# Patient Record
Sex: MALE | Race: WHITE | NOT HISPANIC OR LATINO | Employment: OTHER | URBAN - METROPOLITAN AREA
[De-identification: names, ages, dates, MRNs, and addresses within clinical notes are randomized per-mention and may not be internally consistent; named-entity substitution may affect disease eponyms.]

---

## 2017-06-13 ENCOUNTER — OFFICE VISIT (OUTPATIENT)
Dept: FAMILY MEDICINE CLINIC | Facility: CLINIC | Age: 67
End: 2017-06-13

## 2017-06-13 VITALS
RESPIRATION RATE: 16 BRPM | HEART RATE: 62 BPM | HEIGHT: 67 IN | SYSTOLIC BLOOD PRESSURE: 142 MMHG | BODY MASS INDEX: 37.39 KG/M2 | WEIGHT: 238.2 LBS | DIASTOLIC BLOOD PRESSURE: 80 MMHG | TEMPERATURE: 99.1 F | OXYGEN SATURATION: 98 %

## 2017-06-13 DIAGNOSIS — E78.2 MIXED HYPERLIPIDEMIA: ICD-10-CM

## 2017-06-13 DIAGNOSIS — E78.2 MIXED HYPERLIPIDEMIA: Primary | ICD-10-CM

## 2017-06-13 DIAGNOSIS — R39.14 FEELING OF INCOMPLETE BLADDER EMPTYING: ICD-10-CM

## 2017-06-13 DIAGNOSIS — R07.9 CHEST PAIN, UNSPECIFIED TYPE: ICD-10-CM

## 2017-06-13 DIAGNOSIS — E08.22 DIABETES MELLITUS DUE TO UNDERLYING CONDITION WITH DIABETIC CHRONIC KIDNEY DISEASE, UNSPECIFIED CKD STAGE, UNSPECIFIED LONG TERM INSULIN USE STATUS: ICD-10-CM

## 2017-06-13 DIAGNOSIS — I10 ESSENTIAL HYPERTENSION: ICD-10-CM

## 2017-06-13 DIAGNOSIS — R94.31 ABNORMAL EKG: ICD-10-CM

## 2017-06-13 DIAGNOSIS — Z00.00 MEDICARE ANNUAL WELLNESS VISIT, INITIAL: Primary | ICD-10-CM

## 2017-06-13 LAB
ALBUMIN SERPL-MCNC: 4.4 G/DL (ref 3.5–5.2)
ALBUMIN/GLOB SERPL: 1.6 G/DL
ALP SERPL-CCNC: 58 U/L (ref 39–117)
ALT SERPL W P-5'-P-CCNC: 20 U/L (ref 1–41)
ANION GAP SERPL CALCULATED.3IONS-SCNC: 15 MMOL/L
AST SERPL-CCNC: 15 U/L (ref 1–40)
BILIRUB SERPL-MCNC: 0.3 MG/DL (ref 0.1–1.2)
BUN BLD-MCNC: 14 MG/DL (ref 8–23)
BUN/CREAT SERPL: 11.7 (ref 7–25)
CALCIUM SPEC-SCNC: 9 MG/DL (ref 8.6–10.5)
CHLORIDE SERPL-SCNC: 99 MMOL/L (ref 98–107)
CHOLEST SERPL-MCNC: 216 MG/DL (ref 0–200)
CO2 SERPL-SCNC: 21 MMOL/L (ref 22–29)
CREAT BLD-MCNC: 1.2 MG/DL (ref 0.76–1.27)
ERYTHROCYTE [DISTWIDTH] IN BLOOD BY AUTOMATED COUNT: 13.7 % (ref 4.5–15)
GFR SERPL CREATININE-BSD FRML MDRD: 60 ML/MIN/1.73
GLOBULIN UR ELPH-MCNC: 2.7 GM/DL
GLUCOSE BLD-MCNC: 180 MG/DL (ref 65–99)
HBA1C MFR BLD: 6.64 % (ref 4.8–5.6)
HCT VFR BLD AUTO: 38 % (ref 35–60)
HDLC SERPL-MCNC: 49 MG/DL (ref 40–60)
HGB BLD-MCNC: 12.6 G/DL (ref 13.5–18)
LDLC SERPL CALC-MCNC: 148 MG/DL (ref 0–100)
LDLC/HDLC SERPL: 3.02 {RATIO}
LYMPHOCYTES # BLD AUTO: 2.6 10*3/MM3 (ref 1.2–3.4)
LYMPHOCYTES NFR BLD AUTO: 30.3 % (ref 21–51)
MCH RBC QN AUTO: 28.8 PG (ref 26.1–33.1)
MCHC RBC AUTO-ENTMCNC: 33.3 G/DL (ref 33–37)
MCV RBC AUTO: 86.7 FL (ref 80–99)
MONOCYTES # BLD AUTO: 0.8 10*3/MM3 (ref 0.1–0.6)
MONOCYTES NFR BLD AUTO: 9.3 % (ref 2–9)
NEUTROPHILS # BLD AUTO: 5.2 10*3/MM3 (ref 1.4–6.5)
NEUTROPHILS NFR BLD AUTO: 60.4 % (ref 42–75)
PLATELET # BLD AUTO: 290 10*3/MM3 (ref 150–450)
PMV BLD AUTO: 8.7 FL (ref 7.1–10.5)
POTASSIUM BLD-SCNC: 4.7 MMOL/L (ref 3.5–5.2)
PROT SERPL-MCNC: 7.1 G/DL (ref 6–8.5)
PSA SERPL-MCNC: 0.57 NG/ML (ref 0–4)
RBC # BLD AUTO: 4.38 10*6/MM3 (ref 4–6)
SODIUM BLD-SCNC: 135 MMOL/L (ref 136–145)
TRIGL SERPL-MCNC: 96 MG/DL (ref 0–150)
VLDLC SERPL-MCNC: 19.2 MG/DL (ref 5–40)
WBC NRBC COR # BLD: 8.6 10*3/MM3 (ref 4.5–10)

## 2017-06-13 PROCEDURE — 84153 ASSAY OF PSA TOTAL: CPT | Performed by: INTERNAL MEDICINE

## 2017-06-13 PROCEDURE — 80053 COMPREHEN METABOLIC PANEL: CPT | Performed by: INTERNAL MEDICINE

## 2017-06-13 PROCEDURE — 93000 ELECTROCARDIOGRAM COMPLETE: CPT | Performed by: INTERNAL MEDICINE

## 2017-06-13 PROCEDURE — 99214 OFFICE O/P EST MOD 30 MIN: CPT | Performed by: INTERNAL MEDICINE

## 2017-06-13 PROCEDURE — 80061 LIPID PANEL: CPT | Performed by: INTERNAL MEDICINE

## 2017-06-13 PROCEDURE — 85025 COMPLETE CBC W/AUTO DIFF WBC: CPT | Performed by: INTERNAL MEDICINE

## 2017-06-13 PROCEDURE — 83036 HEMOGLOBIN GLYCOSYLATED A1C: CPT | Performed by: INTERNAL MEDICINE

## 2017-06-13 PROCEDURE — G0438 PPPS, INITIAL VISIT: HCPCS | Performed by: INTERNAL MEDICINE

## 2017-06-13 RX ORDER — SIMVASTATIN 40 MG
40 TABLET ORAL NIGHTLY
Qty: 30 TABLET | Refills: 3 | Status: SHIPPED | OUTPATIENT
Start: 2017-06-13 | End: 2017-10-17 | Stop reason: SDUPTHER

## 2017-06-13 RX ORDER — CITALOPRAM 40 MG/1
40 TABLET ORAL
COMMUNITY
End: 2017-08-04 | Stop reason: SDUPTHER

## 2017-06-13 RX ORDER — LABETALOL 200 MG/1
200 TABLET, FILM COATED ORAL 2 TIMES DAILY
COMMUNITY
End: 2017-11-15 | Stop reason: SDUPTHER

## 2017-06-13 RX ORDER — AMLODIPINE BESYLATE 10 MG/1
10 TABLET ORAL
COMMUNITY
End: 2017-08-04 | Stop reason: SDUPTHER

## 2017-06-13 RX ORDER — LANSOPRAZOLE 30 MG/1
30 CAPSULE, DELAYED RELEASE ORAL DAILY
Qty: 30 CAPSULE | Refills: 3 | Status: SHIPPED | OUTPATIENT
Start: 2017-06-13 | End: 2017-08-23 | Stop reason: ALTCHOICE

## 2017-06-13 RX ORDER — OMEPRAZOLE/SODIUM BICARBONATE 20MG-1.1G
CAPSULE ORAL
COMMUNITY
End: 2017-06-13 | Stop reason: ALTCHOICE

## 2017-06-13 RX ORDER — HYDROCODONE BITARTRATE AND ACETAMINOPHEN 5; 325 MG/1; MG/1
1 TABLET ORAL
COMMUNITY
End: 2017-08-23

## 2017-06-13 RX ORDER — LOVASTATIN 20 MG/1
20 TABLET ORAL
COMMUNITY
End: 2017-06-13 | Stop reason: ALTCHOICE

## 2017-06-13 RX ORDER — FLUTICASONE PROPIONATE 50 MCG
1 SPRAY, SUSPENSION (ML) NASAL
COMMUNITY
End: 2017-08-23 | Stop reason: ALTCHOICE

## 2017-06-13 RX ORDER — IBUPROFEN 800 MG/1
800 TABLET ORAL
COMMUNITY
End: 2017-08-04 | Stop reason: SDUPTHER

## 2017-06-13 RX ORDER — GLIMEPIRIDE 4 MG/1
4 TABLET ORAL DAILY
COMMUNITY
End: 2017-09-20 | Stop reason: SDUPTHER

## 2017-06-13 NOTE — PROGRESS NOTES
QUICK REFERENCE INFORMATION:  The ABCs of the Annual Wellness Visit    Initial Medicare Wellness Visit    HEALTH RISK ASSESSMENT    1950    Recent Hospitalizations:  No hospitalization(s) within the last year..        Current Medical Providers:  Patient Care Team:  Tre De Leon MD as PCP - General  Allegra Andersen MD as PCP - Claims Attributed        Smoking Status:  History   Smoking Status   • Former Smoker   Smokeless Tobacco   • Not on file       Alcohol Consumption:  History   Alcohol Use No     Comment: 2 CANS OF BEER PER MONTH       Depression Screen:   PHQ-9 Depression Screening 6/13/2017   Little interest or pleasure in doing things 0   Feeling down, depressed, or hopeless 0   Trouble falling or staying asleep, or sleeping too much 0   Feeling tired or having little energy 0   Poor appetite or overeating 0   Feeling bad about yourself - or that you are a failure or have let yourself or your family down 0   Trouble concentrating on things, such as reading the newspaper or watching television 0   Moving or speaking so slowly that other people could have noticed. Or the opposite - being so fidgety or restless that you have been moving around a lot more than usual 0   Thoughts that you would be better off dead, or of hurting yourself in some way 0   PHQ-9 Total Score 0   If you checked off any problems, how difficult have these problems made it for you to do your work, take care of things at home, or get along with other people? Not difficult at all       Health Habits and Functional and Cognitive Screening:  Functional & Cognitive Status 6/13/2017   Do you have difficulty preparing food and eating? No   Do you have difficulty bathing yourself? No   Do you have difficulty getting dressed? No   Do you have difficulty using the toilet? No   Do you have difficulty moving around from place to place? No   In the past year have you fallen or experienced a near fall? No   Do you need help using the  phone?  No   Are you deaf or do you have serious difficulty hearing?  No   Do you need help with transportation? No   Do you need help shopping? No   Do you need help preparing meals?  No   Do you need help with housework?  No   Do you need help with laundry? No   Do you need help taking your medications? No   Do you need help managing money? No   Do you have difficulty concentrating, remembering or making decisions? No       Health Habits  Current Diet: Diabetic Diet  Dental Exam: Not up to date  Eye Exam: Up to date  Exercise (times per week): 3 times per week  Current Exercise Activities Include: Walking          Does the patient have evidence of cognitive impairment? Yes    Asiprin use counseling: Taking ASA appropriately as indicated      Recent Lab Results:    Visual Acuity:  No exam data present    Age-appropriate Screening Schedule:  Refer to the list below for future screening recommendations based on patient's age, sex and/or medical conditions. Orders for these recommended tests are listed in the plan section. The patient has been provided with a written plan.    Health Maintenance   Topic Date Due   • INFLUENZA VACCINE  08/01/2017   • HEMOGLOBIN A1C  09/01/2017   • DIABETIC EYE EXAM  11/01/2017   • DIABETIC FOOT EXAM  12/01/2017   • LIPID PANEL  03/01/2018   • URINE MICROALBUMIN  04/01/2018   • PROSTATE CANCER SCREENING  06/13/2018   • COLONOSCOPY  01/01/2023   • TDAP/TD VACCINES (2 - Td) 06/13/2027   • PNEUMOCOCCAL VACCINES (65+ LOW/MEDIUM RISK)  Addressed   • ZOSTER VACCINE  Addressed        Subjective   History of Present Illness    James Solis is a 67 y.o. male who presents for an Annual Wellness Visit.    The following portions of the patient's history were reviewed and updated as appropriate: allergies, current medications, past family history, past medical history, past social history, past surgical history and problem list.    Outpatient Medications Prior to Visit   Medication Sig Dispense Refill    • ipratropium-albuterol (DUO-NEB) 0.5-2.5 mg/mL nebulizer Take 3 mL by nebulization every 4 (four) hours as needed for wheezing. 360 mL 3   • omeprazole (PriLOSEC) 20 MG capsule Take 1 capsule by mouth daily. 90 capsule 1   • simvastatin (ZOCOR) 20 MG tablet      • SITagliptin-MetFORMIN HCl -1000 MG tablet sustained-release 24 hour Take 100-1,000 mg by mouth daily. 30 tablet 0   • Canagliflozin 100 MG tablet Take 1 tablet by mouth daily. 30 tablet 5   • diclofenac (VOLTAREN) 75 MG EC tablet TAKE ONE TABLET BY MOUTH TWICE DAILY 180 tablet 0   • ezetimibe-simvastatin (VYTORIN) 10-20 MG per tablet Take 1 tablet by mouth every night.     • mometasone (NASONEX) 50 MCG/ACT nasal spray 2 sprays into each nostril daily.     • montelukast (SINGULAIR) 10 MG tablet Take 10 mg by mouth every night.     • Umeclidinium-Vilanterol 62.5-25 MCG/INH aerosol powder  Inhale 1 puff daily. 60 each 1   • amLODIPine (NORVASC) 10 MG tablet TAKE ONE TABLET BY MOUTH ONCE DAILY 30 tablet 0   • citalopram (CeleXA) 40 MG tablet Take 40 mg by mouth daily.     • fluticasone (FLONASE) 50 MCG/ACT nasal spray      • glimepiride (AMARYL) 4 MG tablet Take 4 mg by mouth every morning before breakfast.     • ibuprofen (ADVIL,MOTRIN) 800 MG tablet Take 1 tablet by mouth every 6 (six) hours as needed for mild pain (1-3). 30 tablet 2   • labetalol (NORMODYNE) 200 MG tablet Take 200 mg by mouth 2 (two) times a day.     • Omeprazole-Sodium Bicarbonate (ZEGERID PO) Take  by mouth.       No facility-administered medications prior to visit.        Patient Active Problem List   Diagnosis   • Hypertension   • Hyperlipidemia   • Dupuytren's contracture   • DJD (degenerative joint disease)   • Diabetes mellitus   • COPD (chronic obstructive pulmonary disease)   • Primary osteoarthritis involving multiple joints   • Prostate cancer screening   • Open fracture of two ribs on right side with routine healing       Advance Care Planning:  has NO advance directive  "- not interested in additional information    Identification of Risk Factors:  Risk factors include: NA.    Review of Systems    Compared to one year ago, the patient feels his physical health is the same.  Compared to one year ago, the patient feels his mental health is the same.    Objective     Physical Exam    Vitals:    06/13/17 0928   BP: 142/80   BP Location: Left arm   Patient Position: Sitting   Cuff Size: Large Adult   Pulse: 62   Resp: 16   Temp: 99.1 °F (37.3 °C)   TempSrc: Oral   SpO2: 98%   Weight: 238 lb 3.2 oz (108 kg)   Height: 67\" (170.2 cm)   PainSc: 0-No pain       Body mass index is 37.31 kg/(m^2).  Discussed the patient's BMI with him. The BMI is above average; BMI management plan is completed.    Assessment/Plan   Patient Self-Management and Personalized Health Advice  The patient has been provided with information about: diet and preventive services including:   · Advance directive.    Visit Diagnoses:    ICD-10-CM ICD-9-CM   1. Essential hypertension I10 401.9   2. Mixed hyperlipidemia E78.2 272.2   3. Diabetes mellitus due to underlying condition with diabetic chronic kidney disease, unspecified CKD stage, unspecified long term insulin use status E08.22 249.40     585.9   4. Feeling of incomplete bladder emptying  R39.14 788.21   5. Chest pain, unspecified type R07.9 786.50       Orders Placed This Encounter   Procedures   • Comprehensive Metabolic Panel   • Lipid Panel   • Hemoglobin A1c   • PSA   • ECG 12 Lead     Order Specific Question:   Reason for Exam:     Answer:   chest pain   • CBC & Differential     Order Specific Question:   Manual Differential     Answer:   No       Outpatient Encounter Prescriptions as of 6/13/2017   Medication Sig Dispense Refill   • amLODIPine (NORVASC) 10 MG tablet Take 10 mg by mouth.     • aspirin 81 MG tablet Take 81 mg by mouth.     • citalopram (CeleXA) 40 MG tablet Take 40 mg by mouth.     • fluticasone (FLONASE) 50 MCG/ACT nasal spray 1 spray into " each nostril.     • glimepiride (AMARYL) 4 MG tablet Take 4 mg by mouth.     • HYDROcodone-acetaminophen (NORCO) 5-325 MG per tablet Take 1 tablet by mouth.     • ibuprofen (ADVIL,MOTRIN) 800 MG tablet Take 800 mg by mouth.     • ipratropium-albuterol (DUO-NEB) 0.5-2.5 mg/mL nebulizer Take 3 mL by nebulization every 4 (four) hours as needed for wheezing. 360 mL 3   • labetalol (NORMODYNE) 200 MG tablet Take 200 mg by mouth.     • lovastatin (MEVACOR) 20 MG tablet Take 20 mg by mouth.     • metFORMIN (GLUCOPHAGE) 1000 MG tablet Take 1,000 mg by mouth.     • omeprazole (PriLOSEC) 20 MG capsule Take 1 capsule by mouth daily. 90 capsule 1   • simvastatin (ZOCOR) 20 MG tablet      • SITagliptin-MetFORMIN HCl -1000 MG tablet sustained-release 24 hour Take 100-1,000 mg by mouth daily. 30 tablet 0   • [DISCONTINUED] Omeprazole-Sodium Bicarbonate  MG capsule Take  by mouth.     • Canagliflozin 100 MG tablet Take 1 tablet by mouth daily. 30 tablet 5   • diclofenac (VOLTAREN) 75 MG EC tablet TAKE ONE TABLET BY MOUTH TWICE DAILY 180 tablet 0   • ezetimibe-simvastatin (VYTORIN) 10-20 MG per tablet Take 1 tablet by mouth every night.     • lansoprazole (PREVACID) 30 MG capsule Take 1 capsule by mouth Daily. 30 capsule 3   • mometasone (NASONEX) 50 MCG/ACT nasal spray 2 sprays into each nostril daily.     • montelukast (SINGULAIR) 10 MG tablet Take 10 mg by mouth every night.     • Umeclidinium-Vilanterol 62.5-25 MCG/INH aerosol powder  Inhale 1 puff daily. 60 each 1   • [DISCONTINUED] amLODIPine (NORVASC) 10 MG tablet TAKE ONE TABLET BY MOUTH ONCE DAILY 30 tablet 0   • [DISCONTINUED] citalopram (CeleXA) 40 MG tablet Take 40 mg by mouth daily.     • [DISCONTINUED] fluticasone (FLONASE) 50 MCG/ACT nasal spray      • [DISCONTINUED] glimepiride (AMARYL) 4 MG tablet Take 4 mg by mouth every morning before breakfast.     • [DISCONTINUED] ibuprofen (ADVIL,MOTRIN) 800 MG tablet Take 1 tablet by mouth every 6 (six) hours as  needed for mild pain (1-3). 30 tablet 2   • [DISCONTINUED] labetalol (NORMODYNE) 200 MG tablet Take 200 mg by mouth 2 (two) times a day.     • [DISCONTINUED] Omeprazole-Sodium Bicarbonate (ZEGERID PO) Take  by mouth.       No facility-administered encounter medications on file as of 6/13/2017.        Reviewed use of high risk medication in the elderly: not applicable  Reviewed for potential of harmful drug interactions in the elderly: not applicable    Follow Up:  No Follow-up on file.     An After Visit Summary and PPPS with all of these plans were given to the patient.

## 2017-06-13 NOTE — PROGRESS NOTES
Procedure     ECG 12 Lead  Date/Time: 6/13/2017 12:26 PM  Performed by: MARIANO FENTON  Authorized by: MARIANO FENTON   Comparison: not compared with previous ECG   Rhythm: sinus rhythm and sinus bradycardia  Rate: bradycardic  Conduction: conduction normal  ST Depression: V4, V5 and V6  QRS axis: normal  Other: no other findings  Clinical impression: abnormal ECG  Comments: EKG Interpretation Report    Heart rate:    57 beats/min, [place] interval:  173 msec, QRS duration:  101 msec  QTu:416 msec, QTc:  411 msec

## 2017-06-13 NOTE — PROGRESS NOTES
Subjective   James Solis is a 67 y.o. male.     History of Present Illness   Patient was here for a Medicare wellness visit.  His blood pressure is running 120s to 130s at home.  His lipid status been determined with labs today.  His blood pressure was also running 120s over 80s.  He will continue to monitor blood pressure and blood sugar return to our clinic in 4 months.    He did have nonspecific chest pain.  Felt like more like indigestion.  His proton pump inhibitor was changed and EKG was obtained.  EKG does show ST segment patient's in lead V4 through V6.  A pharmacological stress test was ordered.  He will follow-up after testing.    Much of this encounter note is an electronic transcription/translation of spoken language to printed text.  The electronic translation of spoken language may permit erroneous, or at times, nonsensical words or phrases to be inadvertently transcribed.  Although I have reviewed the note for such errors, some may still exist.  The following portions of the patient's history were reviewed and updated as appropriate: allergies, current medications, past family history, past medical history, past social history, past surgical history and problem list.    Review of Systems   Constitutional: Negative for fatigue and fever.   HENT: Positive for congestion. Negative for trouble swallowing.    Eyes: Negative for discharge and visual disturbance.   Respiratory: Positive for chest tightness. Negative for choking and shortness of breath.    Cardiovascular: Negative for chest pain and palpitations.   Gastrointestinal: Negative for abdominal pain and blood in stool.   Endocrine: Negative.    Genitourinary: Negative for genital sores and hematuria.   Musculoskeletal: Negative for gait problem and joint swelling.   Skin: Negative for color change, pallor, rash and wound.   Allergic/Immunologic: Positive for environmental allergies. Negative for immunocompromised state.   Neurological: Negative for  facial asymmetry and speech difficulty.   Psychiatric/Behavioral: Negative for hallucinations and suicidal ideas.       Objective   Physical Exam   Constitutional: He is oriented to person, place, and time. He appears well-developed and well-nourished.   HENT:   Head: Normocephalic.   Eyes: Conjunctivae are normal. Pupils are equal, round, and reactive to light.   Neck: Normal range of motion. Neck supple.   Cardiovascular: Normal rate, regular rhythm and normal heart sounds.  Exam reveals no gallop and no friction rub.    No murmur heard.  Pulmonary/Chest: Effort normal and breath sounds normal. No respiratory distress. He has no wheezes. He has no rales. He exhibits no tenderness.   Abdominal: Soft. Bowel sounds are normal.   Musculoskeletal: Normal range of motion.   Neurological: He is alert and oriented to person, place, and time.   Skin: Skin is warm and dry.   Psychiatric: He has a normal mood and affect. His behavior is normal. Judgment and thought content normal.   Nursing note and vitals reviewed.      Assessment/Plan   Problems Addressed this Visit        Cardiovascular and Mediastinum    Hypertension    Relevant Medications    amLODIPine (NORVASC) 10 MG tablet    labetalol (NORMODYNE) 200 MG tablet    Other Relevant Orders    CBC & Differential (Completed)    Comprehensive Metabolic Panel (Completed)    Lipid Panel (Completed)    Hemoglobin A1c (Completed)    PSA (Completed)    CBC Auto Differential (Completed)    Hyperlipidemia    Relevant Orders    CBC & Differential (Completed)    Comprehensive Metabolic Panel (Completed)    Lipid Panel (Completed)    Hemoglobin A1c (Completed)    PSA (Completed)    CBC Auto Differential (Completed)       Endocrine    Diabetes mellitus    Relevant Medications    glimepiride (AMARYL) 4 MG tablet    metFORMIN (GLUCOPHAGE) 1000 MG tablet    Other Relevant Orders    CBC & Differential (Completed)    Comprehensive Metabolic Panel (Completed)    Lipid Panel (Completed)     Hemoglobin A1c (Completed)    PSA (Completed)    CBC Auto Differential (Completed)      Other Visit Diagnoses     Medicare annual wellness visit, initial    -  Primary    Feeling of incomplete bladder emptying         Relevant Orders    PSA (Completed)    Chest pain, unspecified type        Relevant Orders    ECG 12 Lead    Abnormal EKG        Relevant Orders    Stress Test With Myocardial Perfusion One Day

## 2017-06-13 NOTE — PATIENT INSTRUCTIONS
Diabetes Mellitus and Food  It is important for you to manage your blood sugar (glucose) level. Your blood glucose level can be greatly affected by what you eat. Eating healthier foods in the appropriate amounts throughout the day at about the same time each day will help you control your blood glucose level. It can also help slow or prevent worsening of your diabetes mellitus. Healthy eating may even help you improve the level of your blood pressure and reach or maintain a healthy weight.   General recommendations for healthful eating and cooking habits include:  · Eating meals and snacks regularly. Avoid going long periods of time without eating to lose weight.  · Eating a diet that consists mainly of plant-based foods, such as fruits, vegetables, nuts, legumes, and whole grains.  · Using low-heat cooking methods, such as baking, instead of high-heat cooking methods, such as deep frying.  Work with your dietitian to make sure you understand how to use the Nutrition Facts information on food labels.  HOW CAN FOOD AFFECT ME?  Carbohydrates  Carbohydrates affect your blood glucose level more than any other type of food. Your dietitian will help you determine how many carbohydrates to eat at each meal and teach you how to count carbohydrates. Counting carbohydrates is important to keep your blood glucose at a healthy level, especially if you are using insulin or taking certain medicines for diabetes mellitus.  Alcohol  Alcohol can cause sudden decreases in blood glucose (hypoglycemia), especially if you use insulin or take certain medicines for diabetes mellitus. Hypoglycemia can be a life-threatening condition. Symptoms of hypoglycemia (sleepiness, dizziness, and disorientation) are similar to symptoms of having too much alcohol.   If your health care provider has given you approval to drink alcohol, do so in moderation and use the following guidelines:  · Women should not have more than one drink per day, and men  should not have more than two drinks per day. One drink is equal to:    12 oz of beer.    5 oz of wine.    1½ oz of hard liquor.  · Do not drink on an empty stomach.  · Keep yourself hydrated. Have water, diet soda, or unsweetened iced tea.  · Regular soda, juice, and other mixers might contain a lot of carbohydrates and should be counted.  WHAT FOODS ARE NOT RECOMMENDED?  As you make food choices, it is important to remember that all foods are not the same. Some foods have fewer nutrients per serving than other foods, even though they might have the same number of calories or carbohydrates. It is difficult to get your body what it needs when you eat foods with fewer nutrients. Examples of foods that you should avoid that are high in calories and carbohydrates but low in nutrients include:  · Trans fats (most processed foods list trans fats on the Nutrition Facts label).  · Regular soda.  · Juice.  · Candy.  · Sweets, such as cake, pie, doughnuts, and cookies.  · Fried foods.  WHAT FOODS CAN I EAT?  Eat nutrient-rich foods, which will nourish your body and keep you healthy. The food you should eat also will depend on several factors, including:  · The calories you need.  · The medicines you take.  · Your weight.  · Your blood glucose level.  · Your blood pressure level.  · Your cholesterol level.  You should eat a variety of foods, including:  · Protein.    Lean cuts of meat.    Proteins low in saturated fats, such as fish, egg whites, and beans. Avoid processed meats.  · Fruits and vegetables.    Fruits and vegetables that may help control blood glucose levels, such as apples, mangoes, and yams.  · Dairy products.    Choose fat-free or low-fat dairy products, such as milk, yogurt, and cheese.  · Grains, bread, pasta, and rice.    Choose whole grain products, such as multigrain bread, whole oats, and brown rice. These foods may help control blood pressure.  · Fats.    Foods containing healthful fats, such as nuts,  avocado, olive oil, canola oil, and fish.  DOES EVERYONE WITH DIABETES MELLITUS HAVE THE SAME MEAL PLAN?  Because every person with diabetes mellitus is different, there is not one meal plan that works for everyone. It is very important that you meet with a dietitian who will help you create a meal plan that is just right for you.     This information is not intended to replace advice given to you by your health care provider. Make sure you discuss any questions you have with your health care provider.     Document Released: 09/14/2006 Document Revised: 01/08/2016 Document Reviewed: 11/14/2014  [employer] Interactive Patient Education ©2017 [employer]

## 2017-06-20 ENCOUNTER — TELEPHONE (OUTPATIENT)
Dept: FAMILY MEDICINE CLINIC | Facility: CLINIC | Age: 67
End: 2017-06-20

## 2017-07-14 DIAGNOSIS — R07.9 CHEST PAIN, UNSPECIFIED TYPE: Primary | ICD-10-CM

## 2017-08-04 RX ORDER — CITALOPRAM 40 MG/1
40 TABLET ORAL DAILY
Qty: 30 TABLET | Refills: 1 | Status: SHIPPED | OUTPATIENT
Start: 2017-08-04 | End: 2017-10-17 | Stop reason: SDUPTHER

## 2017-08-04 RX ORDER — IBUPROFEN 800 MG/1
800 TABLET ORAL 3 TIMES DAILY
Qty: 90 TABLET | Refills: 0 | Status: SHIPPED | OUTPATIENT
Start: 2017-08-04 | End: 2017-09-10 | Stop reason: SDUPTHER

## 2017-08-04 RX ORDER — AMLODIPINE BESYLATE 10 MG/1
10 TABLET ORAL DAILY
Qty: 30 TABLET | Refills: 3 | Status: SHIPPED | OUTPATIENT
Start: 2017-08-04 | End: 2017-10-17 | Stop reason: SDUPTHER

## 2017-08-15 ENCOUNTER — CLINICAL SUPPORT (OUTPATIENT)
Dept: FAMILY MEDICINE CLINIC | Facility: CLINIC | Age: 67
End: 2017-08-15

## 2017-08-15 VITALS — DIASTOLIC BLOOD PRESSURE: 70 MMHG | SYSTOLIC BLOOD PRESSURE: 130 MMHG

## 2017-08-15 DIAGNOSIS — Z01.30 BLOOD PRESSURE CHECK: Primary | ICD-10-CM

## 2017-08-15 NOTE — PROGRESS NOTES
Patient presented to the office to monitor blood pressure due to medication change. Patient vitals have been recorded.

## 2017-08-23 ENCOUNTER — OFFICE VISIT (OUTPATIENT)
Dept: CARDIOLOGY | Facility: CLINIC | Age: 67
End: 2017-08-23

## 2017-08-23 VITALS
SYSTOLIC BLOOD PRESSURE: 172 MMHG | BODY MASS INDEX: 37.98 KG/M2 | DIASTOLIC BLOOD PRESSURE: 82 MMHG | WEIGHT: 242 LBS | HEART RATE: 65 BPM | HEIGHT: 67 IN

## 2017-08-23 DIAGNOSIS — I10 ESSENTIAL HYPERTENSION: ICD-10-CM

## 2017-08-23 DIAGNOSIS — E78.2 MIXED HYPERLIPIDEMIA: ICD-10-CM

## 2017-08-23 DIAGNOSIS — R07.2 PRECORDIAL PAIN: Primary | ICD-10-CM

## 2017-08-23 PROCEDURE — 99204 OFFICE O/P NEW MOD 45 MIN: CPT | Performed by: INTERNAL MEDICINE

## 2017-08-23 PROCEDURE — 93000 ELECTROCARDIOGRAM COMPLETE: CPT | Performed by: INTERNAL MEDICINE

## 2017-08-23 RX ORDER — DICLOFENAC SODIUM AND MISOPROSTOL 75; 200 MG/1; UG/1
1 TABLET, DELAYED RELEASE ORAL 2 TIMES DAILY
COMMUNITY

## 2017-08-23 RX ORDER — OMEPRAZOLE/SODIUM BICARBONATE 40; 1680 MG/1; MG/1
POWDER, FOR SUSPENSION ORAL
COMMUNITY
End: 2018-12-20 | Stop reason: ALTCHOICE

## 2017-08-23 NOTE — PROGRESS NOTES
Date of Office Visit: 17  Encounter Provider: Juan Ho MD  Place of Service: [facility]  Patient Name: James Solis  :1950      Chief Complaint   Patient presents with   • Chest Pain   • Fatigue   • Abnormal ECG     History of Present Illness  HPI Comments: Mr Solis is a 68 yo gentleman with history of diabetes, hypertension, hyper lipidemia, and COPD.  He's had negative stress testing in the past.  Starting in 2017 he was having this left-sided chest pain in his axillary area N/A would get it would last an hour and a day and then may be come back in a couple days and would come and go like that and he hasn't had any now month.  It was not related to position, respiration, respiration or exertion.  Denies any associate of palpitations, near-syncope or syncope denies any orthopnea or PND.  Denies any history of prior rheumatic fever, heart attack, heart failure.  Does get some shortness of breath at times with exertional activity.    Chest Pain    Pertinent negatives include no abdominal pain, back pain, diaphoresis, dizziness, fever, headaches, malaise/fatigue, nausea, numbness, vomiting or weakness.   Pertinent negatives for past medical history include no muscle weakness.   Fatigue   Associated symptoms include fatigue. Pertinent negatives include no abdominal pain, congestion, diaphoresis, fever, headaches, joint swelling, myalgias, nausea, numbness, rash, vertigo, vomiting or weakness.         Past Medical History:   Diagnosis Date   • Alcoholism in recovery    • COPD (chronic obstructive pulmonary disease)    • Diabetes mellitus    • DJD (degenerative joint disease)    • Dupuytren's contracture    • Hyperlipidemia    • Hypertension          Past Surgical History:   Procedure Laterality Date   • MOUTH SURGERY           Current Outpatient Prescriptions on File Prior to Visit   Medication Sig Dispense Refill   • amLODIPine (NORVASC) 10 MG tablet  Take 1 tablet by mouth Daily. 30 tablet 3   • aspirin 81 MG tablet Take 1 tablet by mouth Daily. 30 tablet 3   • citalopram (CeleXA) 40 MG tablet Take 1 tablet by mouth Daily. 30 tablet 1   • glimepiride (AMARYL) 4 MG tablet Take 4 mg by mouth 2 (Two) Times a Day.     • ibuprofen (ADVIL,MOTRIN) 800 MG tablet Take 1 tablet by mouth 3 (Three) Times a Day. 90 tablet 0   • ipratropium-albuterol (DUO-NEB) 0.5-2.5 mg/mL nebulizer Take 3 mL by nebulization every 4 (four) hours as needed for wheezing. 360 mL 3   • labetalol (NORMODYNE) 200 MG tablet Take 200 mg by mouth 2 (Two) Times a Day.     • metFORMIN (GLUCOPHAGE) 1000 MG tablet Take 1,000 mg by mouth Daily With Breakfast.     • mometasone (NASONEX) 50 MCG/ACT nasal spray 2 sprays into each nostril daily.     • simvastatin (ZOCOR) 40 MG tablet Take 1 tablet by mouth Every Night. 30 tablet 3   • [DISCONTINUED] Canagliflozin 100 MG tablet Take 1 tablet by mouth daily. 30 tablet 5   • [DISCONTINUED] diclofenac (VOLTAREN) 75 MG EC tablet TAKE ONE TABLET BY MOUTH TWICE DAILY 180 tablet 0   • [DISCONTINUED] fluticasone (FLONASE) 50 MCG/ACT nasal spray 1 spray into each nostril.     • [DISCONTINUED] HYDROcodone-acetaminophen (NORCO) 5-325 MG per tablet Take 1 tablet by mouth.     • [DISCONTINUED] lansoprazole (PREVACID) 30 MG capsule Take 1 capsule by mouth Daily. 30 capsule 3   • [DISCONTINUED] montelukast (SINGULAIR) 10 MG tablet Take 10 mg by mouth every night.     • [DISCONTINUED] omeprazole (PriLOSEC) 20 MG capsule Take 1 capsule by mouth daily. 90 capsule 1   • [DISCONTINUED] SITagliptin-MetFORMIN HCl -1000 MG tablet sustained-release 24 hour Take 100-1,000 mg by mouth daily. 30 tablet 0   • [DISCONTINUED] Umeclidinium-Vilanterol 62.5-25 MCG/INH aerosol powder  Inhale 1 puff daily. 60 each 1     No current facility-administered medications on file prior to visit.          Social History     Social History   • Marital status:      Spouse name: N/A   • Number  of children: N/A   • Years of education: N/A     Occupational History   • retired      Social History Main Topics   • Smoking status: Former Smoker   • Smokeless tobacco: Not on file   • Alcohol use No      Comment: recovering alcoholic, nothingto drink x1yr   • Drug use: Not on file   • Sexual activity: Not on file     Other Topics Concern   • Not on file     Social History Narrative       Family History   Problem Relation Age of Onset   • Heart attack Mother    • Heart attack Father    • Alcohol abuse Other    • Asthma Other    • Diabetes Other    • Hypertension Other          Review of Systems   Constitution: Positive for fatigue. Negative for decreased appetite, diaphoresis, fever, weakness, malaise/fatigue, weight gain and weight loss.   HENT: Negative for congestion, headaches, hearing loss, nosebleeds and tinnitus.    Eyes: Negative for blurred vision, double vision, vision loss in left eye, vision loss in right eye and visual disturbance.   Cardiovascular:        As noted in HPI   Respiratory:        As noted HPI   Endocrine: Negative for cold intolerance and heat intolerance.   Hematologic/Lymphatic: Negative for bleeding problem. Does not bruise/bleed easily.   Skin: Negative for color change, flushing, itching and rash.   Musculoskeletal: Negative for arthritis, back pain, joint pain, joint swelling, muscle weakness and myalgias.   Gastrointestinal: Negative for bloating, abdominal pain, constipation, diarrhea, dysphagia, heartburn, hematemesis, hematochezia, melena, nausea and vomiting.   Genitourinary: Negative for bladder incontinence, dysuria, frequency, nocturia and urgency.   Neurological: Negative for dizziness, focal weakness, light-headedness, loss of balance, numbness, paresthesias and vertigo.   Psychiatric/Behavioral: Negative for depression, memory loss and substance abuse.       Procedures      ECG 12 Lead  Date/Time: 8/23/2017 12:28 PM  Performed by: EMILIANO PERAZA  Authorized by:  "EMILIANO PERAZA   Comparison: compared with previous ECG   Similar to previous ECG  Rhythm: sinus rhythm  Rate: normal  QRS axis: normal                 Objective:    /82 (BP Location: Left arm)  Pulse 65  Ht 67\" (170.2 cm)  Wt 242 lb (110 kg)  BMI 37.9 kg/m2       Physical Exam  Physical Exam   Constitutional: He is oriented to person, place, and time. He appears well-developed and well-nourished. No distress.   HENT:   Head: Normocephalic.   Eyes: Conjunctivae are normal. Pupils are equal, round, and reactive to light. No scleral icterus.   Neck: Normal carotid pulses, no hepatojugular reflux and no JVD present. Carotid bruit is not present. No tracheal deviation, no edema and no erythema present. No thyromegaly present.   Cardiovascular: Normal rate, regular rhythm, S1 normal, S2 normal, normal heart sounds and intact distal pulses.   No extrasystoles are present. PMI is not displaced.  Exam reveals no gallop, no distant heart sounds and no friction rub.    No murmur heard.  Pulses:       Carotid pulses are 2+ on the right side, and 2+ on the left side.       Radial pulses are 2+ on the right side, and 2+ on the left side.        Femoral pulses are 2+ on the right side, and 2+ on the left side.       Dorsalis pedis pulses are 2+ on the right side, and 2+ on the left side.        Posterior tibial pulses are 2+ on the right side, and 2+ on the left side.   Pulmonary/Chest: Effort normal and breath sounds normal. No respiratory distress. He has no decreased breath sounds. He has no wheezes. He has no rhonchi. He has no rales. He exhibits no tenderness.   Abdominal: Soft. Bowel sounds are normal. He exhibits no distension and no mass. There is no hepatosplenomegaly. There is no tenderness. There is no rebound and no guarding.   Musculoskeletal: He exhibits no edema, tenderness or deformity.   Neurological: He is alert and oriented to person, place, and time.   Skin: Skin is warm and dry. No rash noted. He " is not diaphoretic. No cyanosis or erythema. No pallor. Nails show no clubbing.   Psychiatric: He has a normal mood and affect. His speech is normal and behavior is normal. Judgment and thought content normal.           Assessment:   1.  68 yo gentleman with atypical chest pain does not necessarily sound like ischemia however his coronary disease risk consortium score is 67% which is intermediate risk.  He's to repeat turn for perfusion stress test as a one-day non-walking protocol if unremarkable follow him clinically if intermediate to high risk will need catheterization.  2.  Diabetes mellitus followed by PCP.  3.  Hypertension blood pressure elevated today but he did not take his medication.  4.  Hyperlipidemia his LDL is still elevated.  He would benefit from higher dose statin therapy.  5.  COPD.   Plan:

## 2017-09-11 RX ORDER — IBUPROFEN 800 MG/1
TABLET ORAL
Qty: 90 TABLET | Refills: 0 | Status: SHIPPED | OUTPATIENT
Start: 2017-09-11 | End: 2017-12-05 | Stop reason: SDUPTHER

## 2017-09-12 ENCOUNTER — TELEPHONE (OUTPATIENT)
Dept: CARDIOLOGY | Facility: CLINIC | Age: 67
End: 2017-09-12

## 2017-09-12 ENCOUNTER — HOSPITAL ENCOUNTER (OUTPATIENT)
Dept: CARDIOLOGY | Facility: HOSPITAL | Age: 67
Discharge: HOME | End: 2017-09-12
Attending: INTERNAL MEDICINE | Admitting: INTERNAL MEDICINE

## 2017-09-12 LAB
BH CV NUCLEAR PRIOR STUDY: 3
BH CV STRESS BP STAGE 1: NORMAL
BH CV STRESS COMMENTS STAGE 1: NORMAL
BH CV STRESS DOSE REGADENOSON STAGE 1: 0.4
BH CV STRESS DURATION MIN STAGE 1: 0
BH CV STRESS DURATION SEC STAGE 1: 15
BH CV STRESS HR STAGE 1: 79
BH CV STRESS PROTOCOL 1: NORMAL
BH CV STRESS RECOVERY BP: NORMAL MMHG
BH CV STRESS RECOVERY HR: 79 BPM
BH CV STRESS STAGE 1: 1
LV EF NUC BP: 45 %
MAXIMAL PREDICTED HEART RATE: 152 BPM
PERCENT MAX PREDICTED HR: 51.97 %
STRESS BASELINE BP: NORMAL MMHG
STRESS BASELINE HR: 63 BPM
STRESS PERCENT HR: 61 %
STRESS POST EXERCISE DUR SEC: 15 SEC
STRESS POST PEAK BP: NORMAL MMHG
STRESS POST PEAK HR: 79 BPM
STRESS TARGET HR: 129 BPM

## 2017-09-12 PROCEDURE — 93018 CV STRESS TEST I&R ONLY: CPT | Performed by: INTERNAL MEDICINE

## 2017-09-12 PROCEDURE — 78452 HT MUSCLE IMAGE SPECT MULT: CPT

## 2017-09-12 PROCEDURE — 25010000002 REGADENOSON 0.4 MG/5ML SOLUTION: Performed by: INTERNAL MEDICINE

## 2017-09-12 PROCEDURE — 0 TECHNETIUM TETROFOSMIN KIT: Performed by: INTERNAL MEDICINE

## 2017-09-12 PROCEDURE — 93016 CV STRESS TEST SUPVJ ONLY: CPT | Performed by: INTERNAL MEDICINE

## 2017-09-12 PROCEDURE — 93017 CV STRESS TEST TRACING ONLY: CPT

## 2017-09-12 PROCEDURE — A9502 TC99M TETROFOSMIN: HCPCS | Performed by: INTERNAL MEDICINE

## 2017-09-12 PROCEDURE — 78452 HT MUSCLE IMAGE SPECT MULT: CPT | Performed by: INTERNAL MEDICINE

## 2017-09-12 RX ADMIN — REGADENOSON 0.4 MG: 0.08 INJECTION, SOLUTION INTRAVENOUS at 09:35

## 2017-09-12 RX ADMIN — TETROFOSMIN 1 DOSE: 1.38 INJECTION, POWDER, LYOPHILIZED, FOR SOLUTION INTRAVENOUS at 09:35

## 2017-09-12 RX ADMIN — TETROFOSMIN 1 DOSE: 1.38 INJECTION, POWDER, LYOPHILIZED, FOR SOLUTION INTRAVENOUS at 08:35

## 2017-09-12 NOTE — TELEPHONE ENCOUNTER
James Solis  Male, 67 y.o., 1950  PCP:   Tre De Leon MD  Language:   English  Need Interp:   No  Last Weight:   242 lb (110 kg)  Phone:   M: [phone] H: [phone]  Allergies  Celebrex [Celecoxib]  Crestor [Rosuvastatin]  Lipitor [Atorvastatin]  Zetia [Ezetimibe]  Health Maintenance:   Due  FYI:   None  Primary Ins.:   MEDICARE  MRN:   [id number]  MyChart:   Code Exp  Pharmacy:   [facility] [address] [place] [address] [place] [facility] - [phone] [facility] [phone] FX [[id number]]  Preferred Lab:   None  Next Appt with Me:   None  Next Appt Date by Dept:   09/20/2017    Stress Test With Myocardial Perfusion One Day   Status:  Final result   Visible to patient:  No (Not Released) Dx:  Precordial pain Order: [id number]         Details        Reading Physician Reading Date Result Priority       MD Jose Perry MD Louis G Meriwether, MD 9/12/2017 9/12/2017 9/12/2017 Routine           Result Text             · Diaphragmatic attenuation artifact is present.  · Myocardial perfusion imaging indicates a normal myocardial perfusion study with no evidence of ischemia.  · There is mild global hypokinesis..  · Left ventricular ejection fraction is mildly reduced (Calculated EF = 45%).  · Enlarged LV cavity size  · Recommend echocardiogram to verify left ventricular size and function..                    All Measurements          Ref Range & Units 10:22 AM         Nuclear Prior Study  3       BH CV STRESS PROTOCOL 1  Pharmacologic       Stage 1  1       HR Stage 1  79       BP Stage 1  135/70       Duration Min Stage 1  0       Duration Sec Stage 1  15       Stress Dose Regadenoson Stage 1  0.4       Stress Comments Stage 1  15 sec bolus injection       Baseline HR bpm 63       Baseline BP mmHg 155/79       Peak HR bpm 79       Percent Max Pred HR % 51.97       Percent Target HR % 61       Peak BP mmHg 135/70       Recovery HR bpm 79       Recovery BP mmHg 152/75       Target  HR (85%) bpm 129       Max. Pred. HR (100%) bpm 152       Exercise duration (sec) sec 15       Nuc Stress EF % 45                       [facility]  [address] [facility] [address]  [facility] [address] [phone]            Stress Data        Stage HR (bpm) BP (mmHg) Minutes Seconds Dose (mg) Comments       1        79        135/70        0        15        0.4        15 sec bolus injection                  Stress Measurements        Baseline Vitals   Baseline HR 63 bpm        Baseline /79 mmHg         Peak Stress Vitals   Peak HR 79 bpm        Peak /70 mmHg         Recovery Vitals   Recovery HR 79 bpm        Recovery /75 mmHg         Exercise Data   Target HR (85%) 129 bpm        Max. Pred. HR (100%) 152 bpm        Percent Max Pred HR 51.97 %        Exercise duration (sec) 15 sec                 Stress Procedure        Rest ECG  Baseline ECG of normal sinus rhythm noted. Non-specific ST-T wave changes noted.   WA interval = 160 ms. QRS complex = 80 ms. There was no ST segment deviation noted.       Stress ECG  Stress ECG of normal sinus rhythm noted. Non-specific ST-T wave changes noted.   There was no ST segment deviation noted during stress.   There were no arrhythmias during stress.   There were no arrhythmias during recovery.   There were no significant arrhythmias noted during the test.   ECG was interpretable and indicates a normal stress ECG.   Normal ECG stress ECG interpretation.       Stress Description  A pharmacological stress test was performed using regadenoson without low-level exercise.   The patient reached the end of the protocol.   The patient reported shortness of breath during the stress test. Symptoms were not clinically significant.       Stress Findings  No ECG evidence of myocardial ischemia.Negative clinical evidence of myocardial ischemia. Findings consistent with a normal ECG stress test.           Nuclear Perfusion Findings        Study  Impression  Myocardial perfusion imaging indicates a normal myocardial perfusion study with no evidence of ischemia. Impressions are consistent with a low risk study. There is no prior study available for comparison. Recommend echocardiogram to verify left ventricular size and function..       Rest Perfusion Defect 1  There is a moderately sized defect of moderate severity present in the basal inferior and mid inferior wall.       Stress Perfusion Defect 1  There is a moderately sized defect of mild severity present in the basal inferior and mid inferior wall.       Ventricle Size / Description  Left ventricular ejection fraction is mildly reduced (Calculated EF = 45%). Enlarged LV cavity size. There is mild global hypokinesis.. Normal RV cavity size.                   Last Resulted: 09/12/17 11:15 AM                            Status of Other Orders        Order    Lab Status Result Date Provider Status       ECG 12 Lead Final result 8/23/2017 Open       SCANNED EKG Final result 8/23/2017 Open                  Routing History        Priority Sent On From To Message Type        9/12/2017 11:19 AM MD Tre Perry MD Results        9/12/2017 11:19 AM MD Juan Perry MD Results

## 2017-09-12 NOTE — TELEPHONE ENCOUNTER
Called stress test ok except LV dilated and LVEF 45%. Will need echocardiogram. Could not leave message.LITO

## 2017-09-20 ENCOUNTER — OFFICE VISIT (OUTPATIENT)
Dept: FAMILY MEDICINE CLINIC | Facility: CLINIC | Age: 67
End: 2017-09-20

## 2017-09-20 VITALS
HEART RATE: 65 BPM | BODY MASS INDEX: 38.61 KG/M2 | OXYGEN SATURATION: 98 % | HEIGHT: 67 IN | RESPIRATION RATE: 16 BRPM | DIASTOLIC BLOOD PRESSURE: 62 MMHG | TEMPERATURE: 98.6 F | WEIGHT: 246 LBS | SYSTOLIC BLOOD PRESSURE: 144 MMHG

## 2017-09-20 DIAGNOSIS — M54.50 LOW BACK PAIN WITHOUT SCIATICA, UNSPECIFIED BACK PAIN LATERALITY, UNSPECIFIED CHRONICITY: ICD-10-CM

## 2017-09-20 DIAGNOSIS — E78.2 MIXED HYPERLIPIDEMIA: ICD-10-CM

## 2017-09-20 DIAGNOSIS — E08.22 DIABETES MELLITUS DUE TO UNDERLYING CONDITION WITH DIABETIC CHRONIC KIDNEY DISEASE, UNSPECIFIED CKD STAGE, UNSPECIFIED LONG TERM INSULIN USE STATUS: Primary | ICD-10-CM

## 2017-09-20 DIAGNOSIS — J44.9 CHRONIC OBSTRUCTIVE PULMONARY DISEASE, UNSPECIFIED COPD TYPE (HCC): ICD-10-CM

## 2017-09-20 DIAGNOSIS — I10 ESSENTIAL HYPERTENSION: ICD-10-CM

## 2017-09-20 DIAGNOSIS — R80.9 PROTEINURIA: ICD-10-CM

## 2017-09-20 LAB
BACTERIA UR QL AUTO: NORMAL /HPF
BILIRUB UR QL STRIP: NEGATIVE
CLARITY UR: CLEAR
COLOR UR: YELLOW
GLUCOSE UR STRIP-MCNC: NEGATIVE MG/DL
HGB UR QL STRIP.AUTO: NEGATIVE
KETONES UR QL STRIP: NEGATIVE
LEUKOCYTE ESTERASE UR QL STRIP.AUTO: NEGATIVE
NITRITE UR QL STRIP: NEGATIVE
PH UR STRIP.AUTO: 6.5 [PH] (ref 4.6–8)
PROT UR QL STRIP: ABNORMAL
RBC # UR: NORMAL /HPF
REF LAB TEST METHOD: NORMAL
SP GR UR STRIP: >=1.03 (ref 1–1.03)
SQUAMOUS #/AREA URNS HPF: NORMAL /HPF
UROBILINOGEN UR QL STRIP: ABNORMAL
WBC UR QL AUTO: NORMAL /HPF

## 2017-09-20 PROCEDURE — 81001 URINALYSIS AUTO W/SCOPE: CPT | Performed by: INTERNAL MEDICINE

## 2017-09-20 PROCEDURE — 99214 OFFICE O/P EST MOD 30 MIN: CPT | Performed by: INTERNAL MEDICINE

## 2017-09-20 RX ORDER — FLUTICASONE PROPIONATE 50 MCG
2 SPRAY, SUSPENSION (ML) NASAL DAILY
COMMUNITY

## 2017-09-20 RX ORDER — GLIMEPIRIDE 4 MG/1
TABLET ORAL
Qty: 60 TABLET | Refills: 3 | Status: SHIPPED | OUTPATIENT
Start: 2017-09-20 | End: 2017-12-05 | Stop reason: SDUPTHER

## 2017-09-20 RX ORDER — AMOXICILLIN 875 MG/1
875 TABLET, COATED ORAL 2 TIMES DAILY
Qty: 20 TABLET | Refills: 0 | Status: SHIPPED | OUTPATIENT
Start: 2017-09-20 | End: 2017-10-03

## 2017-09-20 NOTE — TELEPHONE ENCOUNTER
Attempted to contact his brother Jacob [phone], non working#.  Mailed pt letter to contact our office to discuss results-serge

## 2017-09-20 NOTE — PROGRESS NOTES
Subjective   James Solis is a 67 y.o. male.     History of Present Illness   Patient was in today for his blood sugar.  His last blood sugar was 110 about a week ago.  His COPD is remaining stable with medication.  His low back pain also is been controlled with over-the-counter medication.  His blood pressures been running 120s over 80s.  He did have some polyuria and urinalysis shows elevated level protein.  Patient again a 24-hour collection for creatinine clearance and total protein.  Patient return to our clinic after the collection is made.    Much of this encounter note is an electronic transcription/translation of spoken language to printed text.  The electronic translation of spoken language may permit erroneous, or at times, nonsensical words or phrases to be inadvertently transcribed.  Although I have reviewed the note for such errors, some may still exist.  The following portions of the patient's history were reviewed and updated as appropriate: allergies, current medications, past family history, past medical history, past social history, past surgical history and problem list.    Review of Systems   Constitutional: Negative for fatigue and fever.   HENT: Positive for congestion. Negative for trouble swallowing.    Eyes: Negative for discharge and visual disturbance.   Respiratory: Negative for choking and shortness of breath.    Cardiovascular: Negative for chest pain and palpitations.   Gastrointestinal: Negative for abdominal pain and blood in stool.   Endocrine: Negative.    Genitourinary: Negative for genital sores and hematuria.   Musculoskeletal: Negative for gait problem and joint swelling.   Skin: Negative for color change, pallor, rash and wound.   Allergic/Immunologic: Positive for environmental allergies. Negative for immunocompromised state.   Neurological: Negative for facial asymmetry and speech difficulty.   Psychiatric/Behavioral: Negative for hallucinations and suicidal ideas.        Objective   Physical Exam   Constitutional: He is oriented to person, place, and time. He appears well-developed and well-nourished.   HENT:   Head: Normocephalic.   Eyes: Conjunctivae are normal. Pupils are equal, round, and reactive to light.   Neck: Normal range of motion. Neck supple.   Cardiovascular: Normal rate, regular rhythm and normal heart sounds.    Pulmonary/Chest: Effort normal and breath sounds normal.   Abdominal: Soft. Bowel sounds are normal.   Musculoskeletal: Normal range of motion.   Neurological: He is alert and oriented to person, place, and time.   Skin: Skin is warm and dry.   Psychiatric: He has a normal mood and affect. His behavior is normal. Judgment and thought content normal.   Nursing note and vitals reviewed.      Assessment/Plan   Problems Addressed this Visit        Cardiovascular and Mediastinum    Hypertension    Hyperlipidemia       Respiratory    COPD (chronic obstructive pulmonary disease)    Relevant Medications    fluticasone (FLONASE) 50 MCG/ACT nasal spray       Endocrine    Diabetes mellitus - Primary    Relevant Medications    glimepiride (AMARYL) 4 MG tablet      Other Visit Diagnoses     Low back pain without sciatica, unspecified back pain laterality, unspecified chronicity        Relevant Orders    Urinalysis with microscopic (Completed)    Urinalysis (Completed)    Urinalysis, Microscopic Only (Completed)    Proteinuria        Relevant Orders    Creatinine Clearance, Urine, 24 Hour    Protein, Urine, 24 Hour

## 2017-09-25 ENCOUNTER — LAB (OUTPATIENT)
Dept: FAMILY MEDICINE CLINIC | Facility: CLINIC | Age: 67
End: 2017-09-25

## 2017-09-25 ENCOUNTER — TELEPHONE (OUTPATIENT)
Dept: FAMILY MEDICINE CLINIC | Facility: CLINIC | Age: 67
End: 2017-09-25

## 2017-09-25 DIAGNOSIS — E08.22 DIABETES MELLITUS DUE TO UNDERLYING CONDITION WITH DIABETIC CHRONIC KIDNEY DISEASE, UNSPECIFIED CKD STAGE, UNSPECIFIED LONG TERM INSULIN USE STATUS: Primary | ICD-10-CM

## 2017-09-25 DIAGNOSIS — E78.2 MIXED HYPERLIPIDEMIA: ICD-10-CM

## 2017-09-25 DIAGNOSIS — R80.9 PROTEINURIA: ICD-10-CM

## 2017-09-25 LAB
ALBUMIN SERPL-MCNC: 4.4 G/DL (ref 3.5–5.2)
ALBUMIN/GLOB SERPL: 1.9 G/DL
ALP SERPL-CCNC: 53 U/L (ref 39–117)
ALT SERPL W P-5'-P-CCNC: 21 U/L (ref 1–41)
ANION GAP SERPL CALCULATED.3IONS-SCNC: 11.4 MMOL/L
AST SERPL-CCNC: 14 U/L (ref 1–40)
BILIRUB CONJ SERPL-MCNC: <0.2 MG/DL (ref 0–0.3)
BILIRUB SERPL-MCNC: 0.3 MG/DL (ref 0.1–1.2)
BUN BLD-MCNC: 15 MG/DL (ref 8–23)
BUN/CREAT SERPL: 12.1 (ref 7–25)
CALCIUM SPEC-SCNC: 9.6 MG/DL (ref 8.6–10.5)
CHLORIDE SERPL-SCNC: 104 MMOL/L (ref 98–107)
CHOLEST SERPL-MCNC: 198 MG/DL (ref 0–200)
CO2 SERPL-SCNC: 25.6 MMOL/L (ref 22–29)
CREAT 24H UR-MCNC: 47.7 MG/DL
CREAT 24H UR-MRATE: 1.43 G/24 HR (ref 1–2.4)
CREAT BLD-MCNC: 1.24 MG/DL (ref 0.76–1.27)
CREAT CL 24H UR+SERPL-VRATE: 82.8 ML/MIN (ref 84–124.3)
CREAT SERPL-MCNC: 1.2 MG/DL (ref 0.76–1.27)
GFR SERPL CREATININE-BSD FRML MDRD: 58 ML/MIN/1.73
GLOBULIN UR ELPH-MCNC: 2.3 GM/DL
GLUCOSE BLD-MCNC: 188 MG/DL (ref 65–99)
HBA1C MFR BLD: 9.5 % (ref 4.8–5.6)
HDLC SERPL-MCNC: 55 MG/DL (ref 40–60)
LDLC SERPL CALC-MCNC: 120 MG/DL (ref 0–100)
LDLC/HDLC SERPL: 2.19 {RATIO}
POTASSIUM BLD-SCNC: 4 MMOL/L (ref 3.5–5.2)
PROT 24H UR-MRATE: 2100 MG/24HOURS (ref 0–150)
PROT SERPL-MCNC: 6.7 G/DL (ref 6–8.5)
PROT UR-MCNC: 70 MG/DL
SODIUM BLD-SCNC: 141 MMOL/L (ref 136–145)
TRIGL SERPL-MCNC: 114 MG/DL (ref 0–150)
VLDLC SERPL-MCNC: 22.8 MG/DL (ref 5–40)

## 2017-09-25 PROCEDURE — 80061 LIPID PANEL: CPT | Performed by: INTERNAL MEDICINE

## 2017-09-25 PROCEDURE — 36415 COLL VENOUS BLD VENIPUNCTURE: CPT | Performed by: INTERNAL MEDICINE

## 2017-09-25 PROCEDURE — 80053 COMPREHEN METABOLIC PANEL: CPT | Performed by: INTERNAL MEDICINE

## 2017-09-25 PROCEDURE — 83036 HEMOGLOBIN GLYCOSYLATED A1C: CPT | Performed by: INTERNAL MEDICINE

## 2017-09-25 PROCEDURE — 82248 BILIRUBIN DIRECT: CPT | Performed by: INTERNAL MEDICINE

## 2017-09-25 NOTE — TELEPHONE ENCOUNTER
Pt informed no openings can call all week for openings but o/w put in next tues at 245 and told to come in early

## 2017-09-27 DIAGNOSIS — R80.9 PROTEINURIA: Primary | ICD-10-CM

## 2017-10-03 ENCOUNTER — OFFICE VISIT (OUTPATIENT)
Dept: FAMILY MEDICINE CLINIC | Facility: CLINIC | Age: 67
End: 2017-10-03

## 2017-10-03 VITALS
WEIGHT: 246 LBS | SYSTOLIC BLOOD PRESSURE: 140 MMHG | HEIGHT: 67 IN | TEMPERATURE: 98.8 F | DIASTOLIC BLOOD PRESSURE: 60 MMHG | BODY MASS INDEX: 38.61 KG/M2 | OXYGEN SATURATION: 97 % | RESPIRATION RATE: 14 BRPM | HEART RATE: 72 BPM

## 2017-10-03 DIAGNOSIS — E78.2 MIXED HYPERLIPIDEMIA: ICD-10-CM

## 2017-10-03 DIAGNOSIS — E08.22 DIABETES MELLITUS DUE TO UNDERLYING CONDITION WITH DIABETIC CHRONIC KIDNEY DISEASE, UNSPECIFIED CKD STAGE, UNSPECIFIED LONG TERM INSULIN USE STATUS: Primary | ICD-10-CM

## 2017-10-03 DIAGNOSIS — R22.9 SUBCUTANEOUS NODULES: ICD-10-CM

## 2017-10-03 DIAGNOSIS — I10 HYPERTENSION, UNSPECIFIED TYPE: ICD-10-CM

## 2017-10-03 PROCEDURE — 99214 OFFICE O/P EST MOD 30 MIN: CPT | Performed by: INTERNAL MEDICINE

## 2017-10-03 RX ORDER — LISINOPRIL 40 MG/1
40 TABLET ORAL DAILY
Qty: 30 TABLET | Refills: 3 | Status: SHIPPED | OUTPATIENT
Start: 2017-10-03 | End: 2017-11-28 | Stop reason: SDUPTHER

## 2017-10-03 NOTE — PROGRESS NOTES
Subjective   James Solis is a 67 y.o. male.     History of Present Illness   Patient was seen today for diabetes.  His blood sugars were running 200s to 180.  He was placed on SGLT-2 and lisinopril.  He had a 24-hour urine protein was 2 g.  Patient was not want see a nephrologist at this time and he was placed on ACE inhibitor and will follow-up in 2 weeks with his blood sugar and blood pressure.  He also had submandibular nodules are not sent with pain and follow-up.  His triglycerides 114, HDL 55, .    Much of this encounter note is an electronic transcription/translation of spoken language to printed text.  The electronic translation of spoken language may permit erroneous, or at times, nonsensical words or phrases to be inadvertently transcribed.  Although I have reviewed the note for such errors, some may still exist.  The following portions of the patient's history were reviewed and updated as appropriate: allergies, current medications, past family history, past medical history, past social history, past surgical history and problem list.    Review of Systems   Constitutional: Negative for fatigue and fever.   HENT: Positive for congestion. Negative for trouble swallowing.    Eyes: Negative for discharge and visual disturbance.   Respiratory: Negative for choking and shortness of breath.    Cardiovascular: Negative for chest pain and palpitations.   Gastrointestinal: Negative for abdominal pain and blood in stool.   Endocrine: Negative.    Genitourinary: Negative for genital sores and hematuria.   Musculoskeletal: Negative for gait problem and joint swelling.   Skin: Negative for color change, pallor, rash and wound.   Allergic/Immunologic: Positive for environmental allergies. Negative for immunocompromised state.   Neurological: Negative for facial asymmetry and speech difficulty.   Psychiatric/Behavioral: Negative for hallucinations and suicidal ideas.       Objective   Physical Exam    Constitutional: He is oriented to person, place, and time. He appears well-developed and well-nourished.   HENT:   Head: Normocephalic.   Eyes: Conjunctivae are normal. Pupils are equal, round, and reactive to light.   Neck: Normal range of motion. Neck supple.   Cardiovascular: Normal rate, regular rhythm and normal heart sounds.    Pulmonary/Chest: Effort normal and breath sounds normal.   Abdominal: Soft. Bowel sounds are normal.   Musculoskeletal: Normal range of motion.   Neurological: He is alert and oriented to person, place, and time.   Skin: Skin is warm and dry.   Psychiatric: He has a normal mood and affect. His behavior is normal. Judgment and thought content normal.   Nursing note and vitals reviewed.      Assessment/Plan   Problems Addressed this Visit        Cardiovascular and Mediastinum    Hypertension    Relevant Medications    lisinopril (PRINIVIL,ZESTRIL) 40 MG tablet    Hyperlipidemia       Endocrine    Diabetes mellitus - Primary    Relevant Medications    Canagliflozin-Metformin HCl ER  MG tablet sustained-release 24 hour      Other Visit Diagnoses     Subcutaneous nodules        Relevant Orders    US Head Neck Soft Tissue

## 2017-10-03 NOTE — PATIENT INSTRUCTIONS
Diabetes Mellitus and Food  It is important for you to manage your blood sugar (glucose) level. Your blood glucose level can be greatly affected by what you eat. Eating healthier foods in the appropriate amounts throughout the day at about the same time each day will help you control your blood glucose level. It can also help slow or prevent worsening of your diabetes mellitus. Healthy eating may even help you improve the level of your blood pressure and reach or maintain a healthy weight.   General recommendations for healthful eating and cooking habits include:  · Eating meals and snacks regularly. Avoid going long periods of time without eating to lose weight.  · Eating a diet that consists mainly of plant-based foods, such as fruits, vegetables, nuts, legumes, and whole grains.  · Using low-heat cooking methods, such as baking, instead of high-heat cooking methods, such as deep frying.  Work with your dietitian to make sure you understand how to use the Nutrition Facts information on food labels.  HOW CAN FOOD AFFECT ME?  Carbohydrates  Carbohydrates affect your blood glucose level more than any other type of food. Your dietitian will help you determine how many carbohydrates to eat at each meal and teach you how to count carbohydrates. Counting carbohydrates is important to keep your blood glucose at a healthy level, especially if you are using insulin or taking certain medicines for diabetes mellitus.  Alcohol  Alcohol can cause sudden decreases in blood glucose (hypoglycemia), especially if you use insulin or take certain medicines for diabetes mellitus. Hypoglycemia can be a life-threatening condition. Symptoms of hypoglycemia (sleepiness, dizziness, and disorientation) are similar to symptoms of having too much alcohol.   If your health care provider has given you approval to drink alcohol, do so in moderation and use the following guidelines:  · Women should not have more than one drink per day, and men  should not have more than two drinks per day. One drink is equal to:    12 oz of beer.    5 oz of wine.    1½ oz of hard liquor.  · Do not drink on an empty stomach.  · Keep yourself hydrated. Have water, diet soda, or unsweetened iced tea.  · Regular soda, juice, and other mixers might contain a lot of carbohydrates and should be counted.  WHAT FOODS ARE NOT RECOMMENDED?  As you make food choices, it is important to remember that all foods are not the same. Some foods have fewer nutrients per serving than other foods, even though they might have the same number of calories or carbohydrates. It is difficult to get your body what it needs when you eat foods with fewer nutrients. Examples of foods that you should avoid that are high in calories and carbohydrates but low in nutrients include:  · Trans fats (most processed foods list trans fats on the Nutrition Facts label).  · Regular soda.  · Juice.  · Candy.  · Sweets, such as cake, pie, doughnuts, and cookies.  · Fried foods.  WHAT FOODS CAN I EAT?  Eat nutrient-rich foods, which will nourish your body and keep you healthy. The food you should eat also will depend on several factors, including:  · The calories you need.  · The medicines you take.  · Your weight.  · Your blood glucose level.  · Your blood pressure level.  · Your cholesterol level.  You should eat a variety of foods, including:  · Protein.    Lean cuts of meat.    Proteins low in saturated fats, such as fish, egg whites, and beans. Avoid processed meats.  · Fruits and vegetables.    Fruits and vegetables that may help control blood glucose levels, such as apples, mangoes, and yams.  · Dairy products.    Choose fat-free or low-fat dairy products, such as milk, yogurt, and cheese.  · Grains, bread, pasta, and rice.    Choose whole grain products, such as multigrain bread, whole oats, and brown rice. These foods may help control blood pressure.  · Fats.    Foods containing healthful fats, such as nuts,  avocado, olive oil, canola oil, and fish.  DOES EVERYONE WITH DIABETES MELLITUS HAVE THE SAME MEAL PLAN?  Because every person with diabetes mellitus is different, there is not one meal plan that works for everyone. It is very important that you meet with a dietitian who will help you create a meal plan that is just right for you.     This information is not intended to replace advice given to you by your health care provider. Make sure you discuss any questions you have with your health care provider.     Document Released: 09/14/2006 Document Revised: 01/08/2016 Document Reviewed: 11/14/2014  [email or web address] Interactive Patient Education ©2017 [email or web address]

## 2017-10-11 ENCOUNTER — HOSPITAL ENCOUNTER (OUTPATIENT)
Dept: ULTRASOUND IMAGING | Facility: HOSPITAL | Age: 67
Discharge: HOME | End: 2017-10-11
Admitting: INTERNAL MEDICINE

## 2017-10-11 DIAGNOSIS — R22.9 SUBCUTANEOUS NODULES: ICD-10-CM

## 2017-10-11 PROCEDURE — 76536 US EXAM OF HEAD AND NECK: CPT

## 2017-10-17 ENCOUNTER — OFFICE VISIT (OUTPATIENT)
Dept: FAMILY MEDICINE CLINIC | Facility: CLINIC | Age: 67
End: 2017-10-17

## 2017-10-17 VITALS
BODY MASS INDEX: 38.92 KG/M2 | DIASTOLIC BLOOD PRESSURE: 74 MMHG | RESPIRATION RATE: 13 BRPM | WEIGHT: 248 LBS | HEART RATE: 62 BPM | SYSTOLIC BLOOD PRESSURE: 140 MMHG | TEMPERATURE: 99.1 F | HEIGHT: 67 IN | OXYGEN SATURATION: 98 %

## 2017-10-17 DIAGNOSIS — E08.22 DIABETES MELLITUS DUE TO UNDERLYING CONDITION WITH DIABETIC CHRONIC KIDNEY DISEASE, UNSPECIFIED CKD STAGE, UNSPECIFIED LONG TERM INSULIN USE STATUS: Primary | ICD-10-CM

## 2017-10-17 DIAGNOSIS — I10 ESSENTIAL HYPERTENSION: ICD-10-CM

## 2017-10-17 DIAGNOSIS — E78.2 MIXED HYPERLIPIDEMIA: ICD-10-CM

## 2017-10-17 DIAGNOSIS — J42 CHRONIC BRONCHITIS, UNSPECIFIED CHRONIC BRONCHITIS TYPE (HCC): ICD-10-CM

## 2017-10-17 PROCEDURE — 99214 OFFICE O/P EST MOD 30 MIN: CPT | Performed by: INTERNAL MEDICINE

## 2017-10-17 PROCEDURE — G0008 ADMIN INFLUENZA VIRUS VAC: HCPCS | Performed by: INTERNAL MEDICINE

## 2017-10-17 RX ORDER — AMLODIPINE BESYLATE 10 MG/1
10 TABLET ORAL DAILY
Qty: 30 TABLET | Refills: 3 | Status: SHIPPED | OUTPATIENT
Start: 2017-10-17 | End: 2017-11-15 | Stop reason: SDUPTHER

## 2017-10-17 RX ORDER — CITALOPRAM 40 MG/1
40 TABLET ORAL DAILY
Qty: 30 TABLET | Refills: 3 | Status: SHIPPED | OUTPATIENT
Start: 2017-10-17 | End: 2017-11-15 | Stop reason: SDUPTHER

## 2017-10-17 RX ORDER — SIMVASTATIN 40 MG
40 TABLET ORAL NIGHTLY
Qty: 30 TABLET | Refills: 3 | Status: SHIPPED | OUTPATIENT
Start: 2017-10-17 | End: 2017-12-05 | Stop reason: SDUPTHER

## 2017-10-17 NOTE — PROGRESS NOTES
Subjective   James Solis is a 67 y.o. male.     History of Present Illness   Patient was here for diabetes.  His blood sugars been running 150 to 130s.  His blood pressure was taken machine is not accurate.  His blood pressures now is around 140s over 80s.  Patient will come in our office 2 days a week to get his blood pressure check.  Bronchitis is been very stable and he has not used his bronchodilators for over 2 weeks.  His lipid status has been controlled with diet and exercise and medication.    Much of this encounter note is an electronic transcription/translation of spoken language to printed text.  The electronic translation of spoken language may permit erroneous, or at times, nonsensical words or phrases to be inadvertently transcribed.  Although I have reviewed the note for such errors, some may still exist.  The following portions of the patient's history were reviewed and updated as appropriate: allergies, current medications, past family history, past medical history, past social history, past surgical history and problem list.    Review of Systems   Constitutional: Negative for fatigue and fever.   HENT: Positive for congestion. Negative for trouble swallowing.    Eyes: Negative for discharge and visual disturbance.   Respiratory: Negative for choking and shortness of breath.    Cardiovascular: Negative for chest pain and palpitations.   Gastrointestinal: Negative for abdominal pain and blood in stool.   Endocrine: Negative.    Genitourinary: Negative for genital sores and hematuria.   Musculoskeletal: Negative for gait problem and joint swelling.   Skin: Negative for color change, pallor, rash and wound.   Allergic/Immunologic: Positive for environmental allergies. Negative for immunocompromised state.   Neurological: Negative for facial asymmetry and speech difficulty.   Psychiatric/Behavioral: Negative for hallucinations and suicidal ideas.       Objective   Physical Exam   Constitutional: He is  oriented to person, place, and time. He appears well-developed and well-nourished.   HENT:   Head: Normocephalic.   Eyes: Conjunctivae are normal. Pupils are equal, round, and reactive to light.   Neck: Normal range of motion. Neck supple.   Cardiovascular: Normal rate, regular rhythm and normal heart sounds.    Pulmonary/Chest: Effort normal and breath sounds normal.   Abdominal: Soft. Bowel sounds are normal.   Musculoskeletal: Normal range of motion.   Neurological: He is alert and oriented to person, place, and time.   Skin: Skin is warm and dry.   Psychiatric: He has a normal mood and affect. His behavior is normal. Judgment and thought content normal.   Nursing note and vitals reviewed.      Assessment/Plan   Problems Addressed this Visit        Cardiovascular and Mediastinum    Hypertension    Relevant Medications    amLODIPine (NORVASC) 10 MG tablet    Hyperlipidemia    Relevant Medications    simvastatin (ZOCOR) 40 MG tablet       Respiratory    COPD (chronic obstructive pulmonary disease)       Endocrine    Diabetes mellitus - Primary

## 2017-11-15 ENCOUNTER — OFFICE VISIT (OUTPATIENT)
Dept: FAMILY MEDICINE CLINIC | Facility: CLINIC | Age: 67
End: 2017-11-15

## 2017-11-15 VITALS
DIASTOLIC BLOOD PRESSURE: 60 MMHG | WEIGHT: 247 LBS | SYSTOLIC BLOOD PRESSURE: 142 MMHG | OXYGEN SATURATION: 98 % | BODY MASS INDEX: 38.77 KG/M2 | HEART RATE: 73 BPM | TEMPERATURE: 98.9 F | HEIGHT: 67 IN

## 2017-11-15 DIAGNOSIS — E78.2 MIXED HYPERLIPIDEMIA: ICD-10-CM

## 2017-11-15 DIAGNOSIS — J01.00 ACUTE MAXILLARY SINUSITIS, RECURRENCE NOT SPECIFIED: ICD-10-CM

## 2017-11-15 DIAGNOSIS — I10 ESSENTIAL HYPERTENSION: ICD-10-CM

## 2017-11-15 DIAGNOSIS — E08.22 DIABETES MELLITUS DUE TO UNDERLYING CONDITION WITH DIABETIC CHRONIC KIDNEY DISEASE, UNSPECIFIED CKD STAGE, UNSPECIFIED LONG TERM INSULIN USE STATUS: Primary | ICD-10-CM

## 2017-11-15 PROCEDURE — 99214 OFFICE O/P EST MOD 30 MIN: CPT | Performed by: INTERNAL MEDICINE

## 2017-11-15 RX ORDER — CITALOPRAM 40 MG/1
40 TABLET ORAL DAILY
Qty: 30 TABLET | Refills: 3 | Status: SHIPPED | OUTPATIENT
Start: 2017-11-15 | End: 2018-08-08 | Stop reason: SDUPTHER

## 2017-11-15 RX ORDER — AZITHROMYCIN 250 MG/1
TABLET, FILM COATED ORAL
Qty: 6 TABLET | Refills: 0 | Status: SHIPPED | OUTPATIENT
Start: 2017-11-15 | End: 2017-11-28

## 2017-11-15 RX ORDER — AMLODIPINE BESYLATE 10 MG/1
10 TABLET ORAL DAILY
Qty: 30 TABLET | Refills: 3 | Status: SHIPPED | OUTPATIENT
Start: 2017-11-15 | End: 2018-08-26 | Stop reason: SDUPTHER

## 2017-11-15 RX ORDER — LABETALOL 200 MG/1
200 TABLET, FILM COATED ORAL 2 TIMES DAILY
Qty: 60 TABLET | Refills: 3 | Status: SHIPPED | OUTPATIENT
Start: 2017-11-15 | End: 2018-03-29 | Stop reason: SDUPTHER

## 2017-11-15 NOTE — PROGRESS NOTES
Subjective   James Solis is a 67 y.o. male.     History of Present Illness   Patient was seen today for diabetes.  Sugars are running 130s to 140s at home.  His blood pressures also 130s over 80s.  His lipid status is been controlled over last several months.  He did develop an acute upper respiratory tract infection and antibiotics were given.  Will follow-up in 4 days not better.    Much of this encounter note is an electronic transcription/translation of spoken language to printed text.  The electronic translation of spoken language may permit erroneous, or at times, nonsensical words or phrases to be inadvertently transcribed.  Although I have reviewed the note for such errors, some may still exist.  The following portions of the patient's history were reviewed and updated as appropriate: allergies, current medications, past family history, past medical history, past social history, past surgical history and problem list.    Review of Systems   Constitutional: Negative for fatigue and fever.   HENT: Positive for congestion. Negative for trouble swallowing.    Eyes: Negative for discharge and visual disturbance.   Respiratory: Negative for choking and shortness of breath.    Cardiovascular: Negative for chest pain and palpitations.   Gastrointestinal: Negative for abdominal pain and blood in stool.   Endocrine: Negative.    Genitourinary: Negative for genital sores and hematuria.   Musculoskeletal: Negative for gait problem and joint swelling.   Skin: Negative for color change, pallor, rash and wound.   Allergic/Immunologic: Positive for environmental allergies. Negative for immunocompromised state.   Neurological: Negative for facial asymmetry and speech difficulty.   Psychiatric/Behavioral: Negative for hallucinations and suicidal ideas.       Objective   Physical Exam   Constitutional: He is oriented to person, place, and time. He appears well-developed and well-nourished.   HENT:   Head: Normocephalic.    Eyes: Conjunctivae are normal. Pupils are equal, round, and reactive to light.   Neck: Normal range of motion. Neck supple.   Cardiovascular: Normal rate, regular rhythm and normal heart sounds.    Pulmonary/Chest: Effort normal and breath sounds normal.   Abdominal: Soft. Bowel sounds are normal.   Musculoskeletal: Normal range of motion.   Neurological: He is alert and oriented to person, place, and time.   Skin: Skin is warm and dry.   Psychiatric: He has a normal mood and affect. His behavior is normal. Judgment and thought content normal.   Nursing note and vitals reviewed.      Assessment/Plan   Problems Addressed this Visit        Cardiovascular and Mediastinum    Hypertension    Relevant Medications    labetalol (NORMODYNE) 200 MG tablet    amLODIPine (NORVASC) 10 MG tablet    Other Relevant Orders    Comprehensive Metabolic Panel    CBC & Differential    Hemoglobin A1c    Lipid Panel    Hyperlipidemia    Relevant Orders    Comprehensive Metabolic Panel    CBC & Differential    Hemoglobin A1c    Lipid Panel       Endocrine    Diabetes mellitus - Primary    Relevant Orders    Comprehensive Metabolic Panel    CBC & Differential    Hemoglobin A1c    Lipid Panel      Other Visit Diagnoses     Acute maxillary sinusitis, recurrence not specified        Relevant Orders    Comprehensive Metabolic Panel    CBC & Differential    Hemoglobin A1c    Lipid Panel

## 2017-11-28 ENCOUNTER — TELEPHONE (OUTPATIENT)
Dept: FAMILY MEDICINE CLINIC | Facility: CLINIC | Age: 67
End: 2017-11-28

## 2017-11-28 ENCOUNTER — OFFICE VISIT (OUTPATIENT)
Dept: FAMILY MEDICINE CLINIC | Facility: CLINIC | Age: 67
End: 2017-11-28

## 2017-11-28 VITALS
OXYGEN SATURATION: 96 % | WEIGHT: 250 LBS | HEART RATE: 72 BPM | TEMPERATURE: 99.5 F | HEIGHT: 67 IN | SYSTOLIC BLOOD PRESSURE: 122 MMHG | RESPIRATION RATE: 16 BRPM | DIASTOLIC BLOOD PRESSURE: 50 MMHG | BODY MASS INDEX: 39.24 KG/M2

## 2017-11-28 DIAGNOSIS — J20.9 ACUTE BRONCHITIS, UNSPECIFIED ORGANISM: Primary | ICD-10-CM

## 2017-11-28 DIAGNOSIS — E08.22 DIABETES MELLITUS DUE TO UNDERLYING CONDITION WITH DIABETIC CHRONIC KIDNEY DISEASE, UNSPECIFIED CKD STAGE, UNSPECIFIED LONG TERM INSULIN USE STATUS: ICD-10-CM

## 2017-11-28 DIAGNOSIS — R06.2 WHEEZING: ICD-10-CM

## 2017-11-28 DIAGNOSIS — I10 ESSENTIAL HYPERTENSION: ICD-10-CM

## 2017-11-28 LAB
FLUAV AG NPH QL: NEGATIVE
FLUBV AG NPH QL IA: NEGATIVE

## 2017-11-28 PROCEDURE — 87804 INFLUENZA ASSAY W/OPTIC: CPT | Performed by: INTERNAL MEDICINE

## 2017-11-28 PROCEDURE — 99214 OFFICE O/P EST MOD 30 MIN: CPT | Performed by: INTERNAL MEDICINE

## 2017-11-28 RX ORDER — LISINOPRIL 40 MG/1
40 TABLET ORAL DAILY
Qty: 30 TABLET | Refills: 3 | Status: SHIPPED | OUTPATIENT
Start: 2017-11-28 | End: 2018-03-28 | Stop reason: SDUPTHER

## 2017-11-28 RX ORDER — DOXYCYCLINE HYCLATE 100 MG
100 TABLET ORAL 2 TIMES DAILY
Qty: 20 TABLET | Refills: 0 | Status: SHIPPED | OUTPATIENT
Start: 2017-11-28 | End: 2018-05-16

## 2017-11-28 RX ORDER — SULFACETAMIDE SODIUM 100 MG/ML
1 SOLUTION/ DROPS OPHTHALMIC 4 TIMES DAILY
Qty: 15 ML | Refills: 1 | Status: SHIPPED | OUTPATIENT
Start: 2017-11-28

## 2017-11-28 NOTE — PROGRESS NOTES
Subjective   James Solis is a 67 y.o. male.     History of Present Illness   Patient was seen today for acute bronchitis.  Patient recently has gotten off a Z-Tavares and started wheezing approximately 7 days ago.  He does have a nonproductive cough and has mini neb treatment at home.  Patient did not want a Mini-Neb or chest x-ray in the office today he did have a flu test which was negative.  Patient return to our clinic in 1 week for follow-up.    Much of this encounter note is an electronic transcription/translation of spoken language to printed text.  The electronic translation of spoken language may permit erroneous, or at times, nonsensical words or phrases to be inadvertently transcribed.  Although I have reviewed the note for such errors, some may still exist.  The following portions of the patient's history were reviewed and updated as appropriate: allergies, current medications, past family history, past medical history, past social history, past surgical history and problem list.    Review of Systems   Constitutional: Negative for fatigue and fever.   HENT: Positive for congestion. Negative for trouble swallowing.    Eyes: Negative for discharge and visual disturbance.   Respiratory: Positive for cough and wheezing. Negative for choking and shortness of breath.    Cardiovascular: Negative for chest pain and palpitations.   Gastrointestinal: Negative for abdominal pain and blood in stool.   Endocrine: Negative.    Genitourinary: Negative for genital sores and hematuria.   Musculoskeletal: Negative for gait problem and joint swelling.   Skin: Negative for color change, pallor, rash and wound.   Allergic/Immunologic: Positive for environmental allergies. Negative for immunocompromised state.   Neurological: Negative for facial asymmetry and speech difficulty.   Psychiatric/Behavioral: Negative for hallucinations and suicidal ideas.       Objective   Physical Exam   Constitutional: He is oriented to person,  place, and time. He appears well-developed and well-nourished.   HENT:   Head: Normocephalic.   Eyes: Conjunctivae are normal. Pupils are equal, round, and reactive to light.   Neck: Normal range of motion. Neck supple.   Cardiovascular: Normal rate, regular rhythm and normal heart sounds.    Pulmonary/Chest: Effort normal. No respiratory distress. He has wheezes. He has no rales. He exhibits no tenderness.   Abdominal: Soft. Bowel sounds are normal.   Musculoskeletal: Normal range of motion.   Neurological: He is alert and oriented to person, place, and time.   Skin: Skin is warm and dry.   Psychiatric: He has a normal mood and affect. His behavior is normal. Judgment and thought content normal.   Nursing note and vitals reviewed.      Assessment/Plan   Problems Addressed this Visit        Cardiovascular and Mediastinum    Hypertension    Relevant Medications    lisinopril (PRINIVIL,ZESTRIL) 40 MG tablet       Endocrine    Diabetes mellitus      Other Visit Diagnoses     Acute bronchitis, unspecified organism    -  Primary    Wheezing        Relevant Orders    Influenza Antigen, Rapid - Swab, Nasopharynx (Completed)

## 2017-12-05 ENCOUNTER — OFFICE VISIT (OUTPATIENT)
Dept: FAMILY MEDICINE CLINIC | Facility: CLINIC | Age: 67
End: 2017-12-05

## 2017-12-05 VITALS
DIASTOLIC BLOOD PRESSURE: 68 MMHG | HEART RATE: 66 BPM | WEIGHT: 248 LBS | OXYGEN SATURATION: 99 % | RESPIRATION RATE: 16 BRPM | BODY MASS INDEX: 38.92 KG/M2 | HEIGHT: 67 IN | SYSTOLIC BLOOD PRESSURE: 128 MMHG | TEMPERATURE: 99.3 F

## 2017-12-05 DIAGNOSIS — J20.9 ACUTE BRONCHITIS, UNSPECIFIED ORGANISM: Primary | ICD-10-CM

## 2017-12-05 DIAGNOSIS — I10 ESSENTIAL HYPERTENSION: ICD-10-CM

## 2017-12-05 DIAGNOSIS — E08.22 DIABETES MELLITUS DUE TO UNDERLYING CONDITION WITH DIABETIC CHRONIC KIDNEY DISEASE, UNSPECIFIED CKD STAGE, UNSPECIFIED LONG TERM INSULIN USE STATUS: ICD-10-CM

## 2017-12-05 PROCEDURE — 99214 OFFICE O/P EST MOD 30 MIN: CPT | Performed by: INTERNAL MEDICINE

## 2017-12-05 RX ORDER — IBUPROFEN 800 MG/1
800 TABLET ORAL 3 TIMES DAILY PRN
Qty: 90 TABLET | Refills: 0 | Status: SHIPPED | OUTPATIENT
Start: 2017-12-05 | End: 2018-01-26 | Stop reason: SDUPTHER

## 2017-12-05 RX ORDER — GLIMEPIRIDE 4 MG/1
TABLET ORAL
Qty: 60 TABLET | Refills: 3 | Status: SHIPPED | OUTPATIENT
Start: 2017-12-05 | End: 2018-08-08 | Stop reason: SDUPTHER

## 2017-12-05 RX ORDER — SIMVASTATIN 40 MG
40 TABLET ORAL NIGHTLY
Qty: 30 TABLET | Refills: 3 | Status: SHIPPED | OUTPATIENT
Start: 2017-12-05 | End: 2018-06-09 | Stop reason: SDUPTHER

## 2017-12-05 NOTE — PROGRESS NOTES
Subjective   James Solis is a 67 y.o. male.     History of Present Illness   Patient was seen today for follow-up on acute bronchitis.  He is still needing his bronchodilators for minor wheezing.  He is finishing up his antibiotics and is no longer have a productive cough.  His wheezing is not near as significant as it was.  Blood pressures were running 120s over 80s.  And his blood sugar was in the 130s to 110s.  At home.  He will continue to monitor blood pressure and blood sugar return to our clinic in rate months.    Much of this encounter note is an electronic transcription/translation of spoken language to printed text.  The electronic translation of spoken language may permit erroneous, or at times, nonsensical words or phrases to be inadvertently transcribed.  Although I have reviewed the note for such errors, some may still exist.  The following portions of the patient's history were reviewed and updated as appropriate: allergies, current medications, past family history, past medical history, past social history, past surgical history and problem list.    Review of Systems   Constitutional: Negative for fatigue and fever.   HENT: Positive for congestion. Negative for trouble swallowing.    Eyes: Negative for discharge and visual disturbance.   Respiratory: Negative for choking and shortness of breath.    Cardiovascular: Negative for chest pain and palpitations.   Gastrointestinal: Negative for abdominal pain and blood in stool.   Endocrine: Negative.    Genitourinary: Negative for genital sores and hematuria.   Musculoskeletal: Negative for gait problem and joint swelling.   Skin: Negative for color change, pallor, rash and wound.   Allergic/Immunologic: Positive for environmental allergies. Negative for immunocompromised state.   Neurological: Negative for facial asymmetry and speech difficulty.   Psychiatric/Behavioral: Negative for hallucinations and suicidal ideas.       Objective   Physical Exam    Constitutional: He is oriented to person, place, and time. He appears well-developed and well-nourished.   HENT:   Head: Normocephalic.   Eyes: Conjunctivae are normal. Pupils are equal, round, and reactive to light.   Neck: Normal range of motion. Neck supple.   Cardiovascular: Normal rate, regular rhythm and normal heart sounds.  Exam reveals no gallop and no friction rub.    No murmur heard.  Pulmonary/Chest: Effort normal. No respiratory distress. He has wheezes. He has no rales. He exhibits no tenderness.   Abdominal: Soft. Bowel sounds are normal.   Musculoskeletal: Normal range of motion.   Neurological: He is alert and oriented to person, place, and time.   Skin: Skin is warm and dry.   Psychiatric: He has a normal mood and affect. His behavior is normal. Judgment and thought content normal.   Nursing note and vitals reviewed.      Assessment/Plan   Problems Addressed this Visit        Cardiovascular and Mediastinum    Hypertension       Endocrine    Diabetes mellitus    Relevant Medications    glimepiride (AMARYL) 4 MG tablet      Other Visit Diagnoses     Acute bronchitis, unspecified organism    -  Primary

## 2018-01-19 ENCOUNTER — TELEPHONE (OUTPATIENT)
Dept: FAMILY MEDICINE CLINIC | Facility: CLINIC | Age: 68
End: 2018-01-19

## 2018-01-19 NOTE — TELEPHONE ENCOUNTER
Pt came in for samples but we have none he asked [facility] and supposedly they will pay for xigduoxr 5/500 ? directions

## 2018-01-22 ENCOUNTER — TELEPHONE (OUTPATIENT)
Dept: FAMILY MEDICINE CLINIC | Facility: CLINIC | Age: 68
End: 2018-01-22

## 2018-01-23 ENCOUNTER — TELEPHONE (OUTPATIENT)
Dept: FAMILY MEDICINE CLINIC | Facility: CLINIC | Age: 68
End: 2018-01-23

## 2018-01-25 ENCOUNTER — TELEPHONE (OUTPATIENT)
Dept: FAMILY MEDICINE CLINIC | Facility: CLINIC | Age: 68
End: 2018-01-25

## 2018-01-25 NOTE — TELEPHONE ENCOUNTER
We changed pt from [employer] to xigduo xr  5/500 qd I have no samples can I give him 5/1000 once a day.?

## 2018-01-26 RX ORDER — IBUPROFEN 800 MG/1
800 TABLET ORAL 3 TIMES DAILY PRN
Qty: 90 TABLET | Refills: 1 | Status: SHIPPED | OUTPATIENT
Start: 2018-01-26 | End: 2018-05-16 | Stop reason: SDUPTHER

## 2018-02-15 ENCOUNTER — OFFICE VISIT (OUTPATIENT)
Dept: FAMILY MEDICINE CLINIC | Facility: CLINIC | Age: 68
End: 2018-02-15

## 2018-02-15 VITALS
SYSTOLIC BLOOD PRESSURE: 148 MMHG | BODY MASS INDEX: 39.27 KG/M2 | OXYGEN SATURATION: 99 % | TEMPERATURE: 98.3 F | WEIGHT: 250.2 LBS | DIASTOLIC BLOOD PRESSURE: 78 MMHG | HEIGHT: 67 IN | HEART RATE: 64 BPM

## 2018-02-15 DIAGNOSIS — M87.00 AVASCULAR NECROSIS (HCC): Primary | ICD-10-CM

## 2018-02-15 DIAGNOSIS — J43.9 PULMONARY EMPHYSEMA, UNSPECIFIED EMPHYSEMA TYPE (HCC): ICD-10-CM

## 2018-02-15 DIAGNOSIS — I10 ESSENTIAL HYPERTENSION: ICD-10-CM

## 2018-02-15 DIAGNOSIS — E78.2 MIXED HYPERLIPIDEMIA: ICD-10-CM

## 2018-02-15 PROBLEM — E11.618 TYPE 2 DIABETES MELLITUS WITH DIABETIC ARTHROPATHY (HCC): Status: ACTIVE | Noted: 2018-02-15

## 2018-02-15 PROCEDURE — 99214 OFFICE O/P EST MOD 30 MIN: CPT | Performed by: INTERNAL MEDICINE

## 2018-02-15 RX ORDER — HYDROCODONE BITARTRATE AND ACETAMINOPHEN 5; 325 MG/1; MG/1
1 TABLET ORAL EVERY 6 HOURS PRN
Qty: 30 TABLET | Refills: 0 | Status: SHIPPED | OUTPATIENT
Start: 2018-02-15 | End: 2019-02-07

## 2018-02-15 NOTE — PROGRESS NOTES
Subjective   James Solis is a 67 y.o. male.     History of Present Illness   Patient was in today with severe left hip pain.  X-ray abdomen indicated possible avascular necrosis of the left hip.  MRI was ordered and he will follow-up after testing.  Patient has severe arthritis in knees and is undergoing knee replacement.  His lipids have been well-controlled with medication.  His blood pressures been running 130s over 80s.  His emphysema is been well-controlled with bronchodilators.    Much of this encounter note is an electronic transcription/translation of spoken language to printed text.  The electronic translation of spoken language may permit erroneous, or at times, nonsensical words or phrases to be inadvertently transcribed.  Although I have reviewed the note for such errors, some may still exist.  The following portions of the patient's history were reviewed and updated as appropriate: allergies, current medications, past family history, past medical history, past social history, past surgical history and problem list.    Review of Systems   Constitutional: Negative for fatigue and fever.   HENT: Positive for congestion. Negative for trouble swallowing.    Eyes: Negative for discharge and visual disturbance.   Respiratory: Negative for choking and shortness of breath.    Cardiovascular: Negative for chest pain and palpitations.   Gastrointestinal: Negative for abdominal pain and blood in stool.   Endocrine: Negative.    Genitourinary: Negative for genital sores and hematuria.   Musculoskeletal: Negative for gait problem and joint swelling.        Left hip and left knee pain   Skin: Negative for color change, pallor, rash and wound.   Allergic/Immunologic: Positive for environmental allergies. Negative for immunocompromised state.   Neurological: Negative for facial asymmetry and speech difficulty.   Psychiatric/Behavioral: Negative for hallucinations and suicidal ideas.       Objective   Physical Exam    Constitutional: He is oriented to person, place, and time. He appears well-developed and well-nourished.   HENT:   Head: Normocephalic.   Eyes: Conjunctivae are normal. Pupils are equal, round, and reactive to light.   Neck: Normal range of motion. Neck supple.   Cardiovascular: Normal rate, regular rhythm and normal heart sounds.    Pulmonary/Chest: Effort normal and breath sounds normal.   Abdominal: Soft. Bowel sounds are normal.   Musculoskeletal: He exhibits edema, tenderness and deformity.   Neurological: He is alert and oriented to person, place, and time. He exhibits abnormal muscle tone. Coordination abnormal.   Skin: Skin is warm and dry.   Psychiatric: He has a normal mood and affect. His behavior is normal. Judgment and thought content normal.   Nursing note and vitals reviewed.      Assessment/Plan   Problems Addressed this Visit        Cardiovascular and Mediastinum    Hypertension    Hyperlipidemia       Respiratory    COPD (chronic obstructive pulmonary disease)      Other Visit Diagnoses     Avascular necrosis    -  Primary    Relevant Orders    MRI Hip Left Without Contrast

## 2018-02-26 RX ORDER — IBUPROFEN 800 MG/1
TABLET ORAL
Qty: 90 TABLET | Refills: 0 | Status: SHIPPED | OUTPATIENT
Start: 2018-02-26 | End: 2018-04-11 | Stop reason: SDUPTHER

## 2018-03-08 ENCOUNTER — OFFICE VISIT (OUTPATIENT)
Dept: FAMILY MEDICINE CLINIC | Facility: CLINIC | Age: 68
End: 2018-03-08

## 2018-03-08 VITALS
TEMPERATURE: 98.6 F | HEIGHT: 67 IN | DIASTOLIC BLOOD PRESSURE: 60 MMHG | BODY MASS INDEX: 40.02 KG/M2 | WEIGHT: 255 LBS | RESPIRATION RATE: 16 BRPM | OXYGEN SATURATION: 99 % | HEART RATE: 70 BPM | SYSTOLIC BLOOD PRESSURE: 124 MMHG

## 2018-03-08 DIAGNOSIS — J44.9 CHRONIC OBSTRUCTIVE PULMONARY DISEASE, UNSPECIFIED COPD TYPE (HCC): ICD-10-CM

## 2018-03-08 DIAGNOSIS — E11.610 TYPE 2 DIABETES MELLITUS WITH DIABETIC NEUROPATHIC ARTHROPATHY, WITHOUT LONG-TERM CURRENT USE OF INSULIN (HCC): ICD-10-CM

## 2018-03-08 DIAGNOSIS — I10 ESSENTIAL HYPERTENSION: ICD-10-CM

## 2018-03-08 DIAGNOSIS — M87.052 AVASCULAR NECROSIS OF BONES OF BOTH HIPS (HCC): Primary | ICD-10-CM

## 2018-03-08 DIAGNOSIS — E78.2 MIXED HYPERLIPIDEMIA: ICD-10-CM

## 2018-03-08 DIAGNOSIS — M87.051 AVASCULAR NECROSIS OF BONES OF BOTH HIPS (HCC): Primary | ICD-10-CM

## 2018-03-08 PROCEDURE — 99214 OFFICE O/P EST MOD 30 MIN: CPT | Performed by: INTERNAL MEDICINE

## 2018-03-08 NOTE — PROGRESS NOTES
Subjective   James Solis is a 67 y.o. male.     History of Present Illness   Patient is following up for hip pain.  His MRI indicated he had avascular necrosis of both hips.  Patient was scheduled with orthopedics.  Patient is an uncontrolled diabetic and was advised to get back on a diabetic diet and low impact exercise 30 minutes 3-5 times a week.  Patient states that he will now follow his diabetic regimen and take his medications as instructed.  Patient also has COPD and is requiring bronchodilators occasional exacerbations.  Blood pressures are running 120s over 80s.  His lipid status is been controlled with medication.  Patient will record his blood pressure and blood sugar return to our clinic in 1 month    Much of this encounter note is an electronic transcription/translation of spoken language to printed text.  The electronic translation of spoken language may permit erroneous, or at times, nonsensical words or phrases to be inadvertently transcribed.  Although I have reviewed the note for such errors, some may still exist.  The following portions of the patient's history were reviewed and updated as appropriate: allergies, current medications, past family history, past medical history, past social history, past surgical history and problem list.    Review of Systems   Constitutional: Negative for fatigue and fever.   HENT: Positive for congestion. Negative for trouble swallowing.    Eyes: Negative for discharge and visual disturbance.   Respiratory: Negative for choking and shortness of breath.    Cardiovascular: Negative for chest pain and palpitations.   Gastrointestinal: Negative for abdominal pain and blood in stool.   Endocrine: Negative.    Genitourinary: Negative for genital sores and hematuria.   Musculoskeletal: Negative for gait problem and joint swelling.        Bilateral hip pain   Skin: Negative for color change, pallor, rash and wound.   Allergic/Immunologic: Positive for environmental  allergies. Negative for immunocompromised state.   Neurological: Negative for facial asymmetry and speech difficulty.   Psychiatric/Behavioral: Negative for hallucinations and suicidal ideas.       Objective   Physical Exam   Constitutional: He is oriented to person, place, and time. He appears well-developed and well-nourished.   HENT:   Head: Normocephalic.   Eyes: Conjunctivae are normal. Pupils are equal, round, and reactive to light.   Neck: Normal range of motion. Neck supple.   Cardiovascular: Normal rate, regular rhythm and normal heart sounds.  Exam reveals no gallop and no friction rub.    No murmur heard.  Pulmonary/Chest: Effort normal and breath sounds normal. No respiratory distress. He has no wheezes. He has no rales. He exhibits no tenderness.   Abdominal: Soft. Bowel sounds are normal.   Musculoskeletal: He exhibits edema, tenderness and deformity.   Neurological: He is alert and oriented to person, place, and time.   Skin: Skin is warm and dry.   Psychiatric: He has a normal mood and affect. His behavior is normal. Judgment and thought content normal.   Nursing note and vitals reviewed.      Assessment/Plan   Problems Addressed this Visit        Cardiovascular and Mediastinum    Hypertension    Hyperlipidemia       Respiratory    COPD (chronic obstructive pulmonary disease)       Endocrine    Type 2 diabetes mellitus with diabetic arthropathy       Musculoskeletal and Integument    Avascular necrosis of bones of both hips - Primary

## 2018-03-28 RX ORDER — LISINOPRIL 40 MG/1
40 TABLET ORAL DAILY
Qty: 30 TABLET | Refills: 3 | Status: SHIPPED | OUTPATIENT
Start: 2018-03-28 | End: 2018-05-16 | Stop reason: SDUPTHER

## 2018-03-29 RX ORDER — LABETALOL 200 MG/1
TABLET, FILM COATED ORAL
Qty: 60 TABLET | Refills: 3 | Status: SHIPPED | OUTPATIENT
Start: 2018-03-29 | End: 2018-12-24 | Stop reason: SDUPTHER

## 2018-04-11 ENCOUNTER — OFFICE VISIT (OUTPATIENT)
Dept: FAMILY MEDICINE CLINIC | Facility: CLINIC | Age: 68
End: 2018-04-11

## 2018-04-11 VITALS
SYSTOLIC BLOOD PRESSURE: 142 MMHG | HEIGHT: 67 IN | BODY MASS INDEX: 38.89 KG/M2 | WEIGHT: 247.8 LBS | OXYGEN SATURATION: 97 % | TEMPERATURE: 98.7 F | HEART RATE: 74 BPM | DIASTOLIC BLOOD PRESSURE: 72 MMHG

## 2018-04-11 DIAGNOSIS — I10 ESSENTIAL HYPERTENSION: ICD-10-CM

## 2018-04-11 DIAGNOSIS — E11.610 TYPE 2 DIABETES MELLITUS WITH DIABETIC NEUROPATHIC ARTHROPATHY, WITHOUT LONG-TERM CURRENT USE OF INSULIN (HCC): Primary | ICD-10-CM

## 2018-04-11 DIAGNOSIS — E78.2 MIXED HYPERLIPIDEMIA: ICD-10-CM

## 2018-04-11 DIAGNOSIS — E55.9 VITAMIN D DEFICIENCY: ICD-10-CM

## 2018-04-11 LAB
25(OH)D3 SERPL-MCNC: 16.6 NG/ML (ref 30–100)
ALBUMIN SERPL-MCNC: 3.9 G/DL (ref 3.5–5.2)
ALBUMIN/GLOB SERPL: 1.3 G/DL
ALP SERPL-CCNC: 66 U/L (ref 39–117)
ALT SERPL W P-5'-P-CCNC: 19 U/L (ref 1–41)
ANION GAP SERPL CALCULATED.3IONS-SCNC: 14.4 MMOL/L
AST SERPL-CCNC: 16 U/L (ref 1–40)
BILIRUB SERPL-MCNC: <0.2 MG/DL (ref 0.1–1.2)
BUN BLD-MCNC: 22 MG/DL (ref 8–23)
BUN/CREAT SERPL: 16.3 (ref 7–25)
CALCIUM SPEC-SCNC: 8.9 MG/DL (ref 8.6–10.5)
CHLORIDE SERPL-SCNC: 109 MMOL/L (ref 98–107)
CHOLEST SERPL-MCNC: 153 MG/DL (ref 0–200)
CO2 SERPL-SCNC: 20.6 MMOL/L (ref 22–29)
CREAT BLD-MCNC: 1.35 MG/DL (ref 0.76–1.27)
ERYTHROCYTE [DISTWIDTH] IN BLOOD BY AUTOMATED COUNT: 15.1 % (ref 4.5–15)
GFR SERPL CREATININE-BSD FRML MDRD: 52 ML/MIN/1.73
GLOBULIN UR ELPH-MCNC: 2.9 GM/DL
GLUCOSE BLD-MCNC: 183 MG/DL (ref 65–99)
HBA1C MFR BLD: 7.27 % (ref 4.8–5.6)
HCT VFR BLD AUTO: 33.6 % (ref 35–60)
HDLC SERPL-MCNC: 36 MG/DL (ref 40–60)
HGB BLD-MCNC: 11.1 G/DL (ref 13.5–18)
LDLC SERPL CALC-MCNC: 90 MG/DL (ref 0–100)
LDLC/HDLC SERPL: 2.51 {RATIO}
LYMPHOCYTES # BLD AUTO: 2.7 10*3/MM3 (ref 1.2–3.4)
LYMPHOCYTES NFR BLD AUTO: 23 % (ref 21–51)
MCH RBC QN AUTO: 27.5 PG (ref 26.1–33.1)
MCHC RBC AUTO-ENTMCNC: 33 G/DL (ref 33–37)
MCV RBC AUTO: 83.4 FL (ref 80–99)
MONOCYTES # BLD AUTO: 0.8 10*3/MM3 (ref 0.1–0.6)
MONOCYTES NFR BLD AUTO: 7.3 % (ref 2–9)
NEUTROPHILS # BLD AUTO: 8.1 10*3/MM3 (ref 1.4–6.5)
NEUTROPHILS NFR BLD AUTO: 69.7 % (ref 42–75)
PLATELET # BLD AUTO: 324 10*3/MM3 (ref 150–450)
PMV BLD AUTO: 8.4 FL (ref 7.1–10.5)
POTASSIUM BLD-SCNC: 4.4 MMOL/L (ref 3.5–5.2)
PROT SERPL-MCNC: 6.8 G/DL (ref 6–8.5)
RBC # BLD AUTO: 4.03 10*6/MM3 (ref 4–6)
SODIUM BLD-SCNC: 144 MMOL/L (ref 136–145)
TRIGL SERPL-MCNC: 134 MG/DL (ref 0–150)
VLDLC SERPL-MCNC: 26.8 MG/DL (ref 5–40)
WBC NRBC COR # BLD: 11.6 10*3/MM3 (ref 4.5–10)

## 2018-04-11 PROCEDURE — 83036 HEMOGLOBIN GLYCOSYLATED A1C: CPT | Performed by: INTERNAL MEDICINE

## 2018-04-11 PROCEDURE — 85025 COMPLETE CBC W/AUTO DIFF WBC: CPT | Performed by: INTERNAL MEDICINE

## 2018-04-11 PROCEDURE — 82306 VITAMIN D 25 HYDROXY: CPT | Performed by: INTERNAL MEDICINE

## 2018-04-11 PROCEDURE — 36415 COLL VENOUS BLD VENIPUNCTURE: CPT | Performed by: INTERNAL MEDICINE

## 2018-04-11 PROCEDURE — 80061 LIPID PANEL: CPT | Performed by: INTERNAL MEDICINE

## 2018-04-11 PROCEDURE — 99214 OFFICE O/P EST MOD 30 MIN: CPT | Performed by: INTERNAL MEDICINE

## 2018-04-11 PROCEDURE — 80053 COMPREHEN METABOLIC PANEL: CPT | Performed by: INTERNAL MEDICINE

## 2018-04-11 RX ORDER — ERGOCALCIFEROL 1.25 MG/1
CAPSULE ORAL
Qty: 30 CAPSULE | Refills: 3 | Status: SHIPPED | OUTPATIENT
Start: 2018-04-11 | End: 2018-08-09 | Stop reason: SDUPTHER

## 2018-04-11 NOTE — PROGRESS NOTES
Subjective   James Solis is a 68 y.o. male.     History of Present Illness   Patient was seen for diabetes.  His blood pressures been running 120s to 130 at home.  Patient did begin a diabetic diet and low impact exercising 30 minutes 3-5 times a week.  Her pressures also been running 120s over 80s.  His lipid status is been well-controlled with his medication.  Patient will continue to monitor his blood pressure blood sugar and return to our clinic in 4 months.  His hemoglobin A1c was 7.2%.    Dictated utilizing Dragon dictation. If there are questions or for further clarification, please contact me.   The following portions of the patient's history were reviewed and updated as appropriate: allergies, current medications, past family history, past medical history, past social history, past surgical history and problem list.    Review of Systems   Constitutional: Negative for fatigue and fever.   HENT: Positive for congestion. Negative for trouble swallowing.    Eyes: Negative for discharge and visual disturbance.   Respiratory: Negative for choking and shortness of breath.    Cardiovascular: Negative for chest pain and palpitations.   Gastrointestinal: Negative for abdominal pain and blood in stool.   Endocrine: Negative.    Genitourinary: Negative for genital sores and hematuria.   Musculoskeletal: Negative for gait problem and joint swelling.   Skin: Negative for color change, pallor, rash and wound.   Allergic/Immunologic: Positive for environmental allergies. Negative for immunocompromised state.   Neurological: Negative for facial asymmetry and speech difficulty.   Psychiatric/Behavioral: Negative for hallucinations and suicidal ideas.       Objective   Physical Exam   Constitutional: He is oriented to person, place, and time. He appears well-developed and well-nourished.   HENT:   Head: Normocephalic.   Eyes: Conjunctivae are normal. Pupils are equal, round, and reactive to light.   Neck: Normal range of  motion. Neck supple.   Cardiovascular: Normal rate, regular rhythm and normal heart sounds.    Pulmonary/Chest: Effort normal and breath sounds normal.   Abdominal: Soft. Bowel sounds are normal.   Musculoskeletal: Normal range of motion.   Neurological: He is alert and oriented to person, place, and time.   Skin: Skin is warm and dry.   Psychiatric: He has a normal mood and affect. His behavior is normal. Judgment and thought content normal.   Nursing note and vitals reviewed.      Assessment/Plan   Problems Addressed this Visit        Cardiovascular and Mediastinum    Hypertension    Relevant Orders    Vitamin D 25 Hydroxy    Hyperlipidemia    Relevant Orders    Vitamin D 25 Hydroxy       Endocrine    Type 2 diabetes mellitus with diabetic arthropathy - Primary    Relevant Orders    Vitamin D 25 Hydroxy      Other Visit Diagnoses     Vitamin D deficiency         Relevant Orders    Vitamin D 25 Hydroxy

## 2018-04-12 ENCOUNTER — TELEPHONE (OUTPATIENT)
Dept: FAMILY MEDICINE CLINIC | Facility: CLINIC | Age: 68
End: 2018-04-12

## 2018-05-02 RX ORDER — IBUPROFEN 800 MG/1
TABLET ORAL
Qty: 90 TABLET | Refills: 0 | Status: SHIPPED | OUTPATIENT
Start: 2018-05-02 | End: 2018-08-08 | Stop reason: SDUPTHER

## 2018-05-16 ENCOUNTER — OFFICE VISIT (OUTPATIENT)
Dept: FAMILY MEDICINE CLINIC | Facility: CLINIC | Age: 68
End: 2018-05-16

## 2018-05-16 VITALS
BODY MASS INDEX: 37.83 KG/M2 | DIASTOLIC BLOOD PRESSURE: 60 MMHG | OXYGEN SATURATION: 96 % | HEIGHT: 67 IN | TEMPERATURE: 97.7 F | HEART RATE: 69 BPM | WEIGHT: 241 LBS | SYSTOLIC BLOOD PRESSURE: 132 MMHG

## 2018-05-16 DIAGNOSIS — D64.9 LOW HEMOGLOBIN: ICD-10-CM

## 2018-05-16 DIAGNOSIS — J02.9 ACUTE PHARYNGITIS, UNSPECIFIED ETIOLOGY: ICD-10-CM

## 2018-05-16 DIAGNOSIS — R05.9 COUGH: ICD-10-CM

## 2018-05-16 DIAGNOSIS — R06.2 WHEEZING: Primary | ICD-10-CM

## 2018-05-16 LAB
ERYTHROCYTE [DISTWIDTH] IN BLOOD BY AUTOMATED COUNT: 15.6 % (ref 4.5–15)
HCT VFR BLD AUTO: 36.2 % (ref 35–60)
HETEROPH AB SER QL LA: NEGATIVE
HGB BLD-MCNC: 11.5 G/DL (ref 13.5–18)
LYMPHOCYTES # BLD AUTO: 3 10*3/MM3 (ref 1.2–3.4)
LYMPHOCYTES NFR BLD AUTO: 22.1 % (ref 21–51)
MCH RBC QN AUTO: 26.7 PG (ref 26.1–33.1)
MCHC RBC AUTO-ENTMCNC: 31.8 G/DL (ref 33–37)
MCV RBC AUTO: 83.8 FL (ref 80–99)
MONOCYTES # BLD AUTO: 0.5 10*3/MM3 (ref 0.1–0.6)
MONOCYTES NFR BLD AUTO: 3.4 % (ref 2–9)
NEUTROPHILS # BLD AUTO: 10.1 10*3/MM3 (ref 1.4–6.5)
NEUTROPHILS NFR BLD AUTO: 74.5 % (ref 42–75)
PLATELET # BLD AUTO: 342 10*3/MM3 (ref 150–450)
PMV BLD AUTO: 8 FL (ref 7.1–10.5)
RBC # BLD AUTO: 4.32 10*6/MM3 (ref 4–6)
S PYO AG THROAT QL: NEGATIVE
WBC NRBC COR # BLD: 13.5 10*3/MM3 (ref 4.5–10)

## 2018-05-16 PROCEDURE — 71046 X-RAY EXAM CHEST 2 VIEWS: CPT | Performed by: NURSE PRACTITIONER

## 2018-05-16 PROCEDURE — 87070 CULTURE OTHR SPECIMN AEROBIC: CPT | Performed by: NURSE PRACTITIONER

## 2018-05-16 PROCEDURE — 86308 HETEROPHILE ANTIBODY SCREEN: CPT | Performed by: NURSE PRACTITIONER

## 2018-05-16 PROCEDURE — 99213 OFFICE O/P EST LOW 20 MIN: CPT | Performed by: NURSE PRACTITIONER

## 2018-05-16 PROCEDURE — 87880 STREP A ASSAY W/OPTIC: CPT | Performed by: NURSE PRACTITIONER

## 2018-05-16 PROCEDURE — 36415 COLL VENOUS BLD VENIPUNCTURE: CPT | Performed by: NURSE PRACTITIONER

## 2018-05-16 PROCEDURE — 85025 COMPLETE CBC W/AUTO DIFF WBC: CPT | Performed by: NURSE PRACTITIONER

## 2018-05-16 RX ORDER — AMOXICILLIN 875 MG/1
875 TABLET, COATED ORAL 2 TIMES DAILY
Qty: 14 TABLET | Refills: 0 | Status: SHIPPED | OUTPATIENT
Start: 2018-05-16 | End: 2018-05-23

## 2018-05-16 RX ORDER — IBUPROFEN 800 MG/1
800 TABLET ORAL 3 TIMES DAILY PRN
Qty: 90 TABLET | Refills: 1 | Status: SHIPPED | OUTPATIENT
Start: 2018-05-16 | End: 2018-08-30 | Stop reason: SDUPTHER

## 2018-05-16 RX ORDER — ALBUTEROL SULFATE 90 UG/1
2 AEROSOL, METERED RESPIRATORY (INHALATION) EVERY 4 HOURS PRN
Qty: 1 INHALER | Refills: 3 | Status: SHIPPED | OUTPATIENT
Start: 2018-05-16 | End: 2020-01-03 | Stop reason: SDUPTHER

## 2018-05-16 RX ORDER — LISINOPRIL 40 MG/1
40 TABLET ORAL DAILY
Qty: 30 TABLET | Refills: 3 | Status: SHIPPED | OUTPATIENT
Start: 2018-05-16 | End: 2018-11-06 | Stop reason: SDUPTHER

## 2018-05-16 NOTE — PROGRESS NOTES
Subjective   James Solis is a 68 y.o. male.     History of Present Illness   C/o sore throat, cough, saw Dr. De Leon about 1 month ago did not have abx, he states symptoms started about 1 month ago, intermittent, currently started about 4 days ago, hurts to swallow, he denies fever, he c/o cough, non productive, he has sinus pressure, chest congestion, he denies nasal drainage, he does have ear pain. He states he took mucinex OTC, he denies smoking. He states he was exposed to cold when symptoms started, hx of Copd, he uses nebulizer at home, not recently.     The following portions of the patient's history were reviewed and updated as appropriate: allergies, current medications, past family history, past medical history, past social history, past surgical history and problem list.    Review of Systems   Constitutional: Negative for chills, diaphoresis and fever.   HENT: Positive for congestion, ear pain, postnasal drip, rhinorrhea, sinus pressure, sore throat and trouble swallowing. Negative for voice change.    Eyes: Negative for pain.   Respiratory: Positive for cough and wheezing. Negative for chest tightness and shortness of breath.    Cardiovascular: Negative for chest pain.   Musculoskeletal: Negative for arthralgias and myalgias.   Skin: Negative for pallor.   Neurological: Negative for dizziness, light-headedness and headache.   All other systems reviewed and are negative.      Objective   Physical Exam   Constitutional: He is oriented to person, place, and time. He appears well-developed and well-nourished.   HENT:   Head: Normocephalic.   Right Ear: Tympanic membrane and ear canal normal.   Left Ear: Tympanic membrane and ear canal normal.   Nose: Right sinus exhibits maxillary sinus tenderness. Left sinus exhibits maxillary sinus tenderness.   Mouth/Throat: Uvula is midline and mucous membranes are normal. Posterior oropharyngeal erythema present.   Eyes: Pupils are equal, round, and reactive to light.    Neck: Normal range of motion.   Cardiovascular: Normal rate, regular rhythm and normal heart sounds.    Pulmonary/Chest: Effort normal. He has no decreased breath sounds. He has wheezes in the right upper field. He has no rhonchi. He has no rales.   Musculoskeletal: Normal range of motion.   Lymphadenopathy:     He has no cervical adenopathy.   Neurological: He is alert and oriented to person, place, and time.   Skin: Skin is warm and dry.   Psychiatric: He has a normal mood and affect. His behavior is normal.   Nursing note and vitals reviewed.  cxr 2v in office for cough, wheezing, shows NAD, compared to cxr from 2/11/16, similar in comparison awaiting radiology over read.       Assessment/Plan   James was seen today for sore throat and cough.    Diagnoses and all orders for this visit:    Wheezing  -     XR Chest 2 View    Acute pharyngitis, unspecified etiology  -     CBC & Differential  -     Mononucleosis Screen  -     Rapid Strep A Screen - Swab, Throat  -     Throat / Upper Respiratory Culture - Swab, Throat  -     CBC Auto Differential    Cough  -     XR Chest 2 View    Low hemoglobin  -     Occult Blood, Fecal By Immunoassay - Stool, Per Rectum; Future    Other orders  -     amoxicillin (AMOXIL) 875 MG tablet; Take 1 tablet by mouth 2 (Two) Times a Day for 7 days.  -     lisinopril (PRINIVIL,ZESTRIL) 40 MG tablet; Take 1 tablet by mouth Daily.  -     ibuprofen (ADVIL,MOTRIN) 800 MG tablet; Take 1 tablet by mouth 3 (Three) Times a Day As Needed for Mild Pain .  -     albuterol (PROVENTIL HFA;VENTOLIN HFA) 108 (90 Base) MCG/ACT inhaler; Inhale 2 puffs Every 4 (Four) Hours As Needed for Wheezing.      cxr today, will call with results.   Cbc, strep, mono, throat culture,   Warm salt water gargles as needed.   Start amoxicillin 875mg 2x day for 7 days.   Cont nebulizer at home as needed.   Albuterol inhaler 2 puffs every 4 hours as needed for wheezing or SOA. Educated about use.   If symptoms persist 5-7  days call office.   Increase fluid intake, get plenty of rest.   Patient agrees with plan of care and understands instructions. Call if worsening symptoms or any problems or concerns.

## 2018-05-16 NOTE — PATIENT INSTRUCTIONS
cxr today, will call with results.   Cbc, strep, mono, throat culture,   Warm salt water gargles as needed.   Start amoxicillin 875mg 2x day for 7 days.   Cont nebulizer at home as needed.   Albuterol inhaler 2 puffs every 4 hours as needed for wheezing or SOA. Educated about use.   If symptoms persist 5-7 days call office.   Increase fluid intake, get plenty of rest.   Patient agrees with plan of care and understands instructions. Call if worsening symptoms or any problems or concerns.

## 2018-05-18 ENCOUNTER — TELEPHONE (OUTPATIENT)
Dept: FAMILY MEDICINE CLINIC | Facility: CLINIC | Age: 68
End: 2018-05-18

## 2018-05-18 LAB — BACTERIA SPEC AEROBE CULT: NORMAL

## 2018-06-06 ENCOUNTER — TELEPHONE (OUTPATIENT)
Dept: FAMILY MEDICINE CLINIC | Facility: CLINIC | Age: 68
End: 2018-06-06

## 2018-06-06 ENCOUNTER — OFFICE VISIT (OUTPATIENT)
Dept: FAMILY MEDICINE CLINIC | Facility: CLINIC | Age: 68
End: 2018-06-06

## 2018-06-06 VITALS
SYSTOLIC BLOOD PRESSURE: 130 MMHG | HEART RATE: 71 BPM | HEIGHT: 67 IN | TEMPERATURE: 97.6 F | DIASTOLIC BLOOD PRESSURE: 80 MMHG | RESPIRATION RATE: 16 BRPM | BODY MASS INDEX: 37.89 KG/M2 | WEIGHT: 241.4 LBS | OXYGEN SATURATION: 98 %

## 2018-06-06 DIAGNOSIS — H00.014 HORDEOLUM EXTERNUM OF LEFT UPPER EYELID: Primary | ICD-10-CM

## 2018-06-06 DIAGNOSIS — R05.9 COUGH: ICD-10-CM

## 2018-06-06 LAB
ERYTHROCYTE [DISTWIDTH] IN BLOOD BY AUTOMATED COUNT: 15.1 % (ref 4.5–15)
HCT VFR BLD AUTO: 33.6 % (ref 35–60)
HGB BLD-MCNC: 11.4 G/DL (ref 13.5–18)
LYMPHOCYTES # BLD AUTO: 2.8 10*3/MM3 (ref 1.2–3.4)
LYMPHOCYTES NFR BLD AUTO: 28.3 % (ref 21–51)
MCH RBC QN AUTO: 27.7 PG (ref 26.1–33.1)
MCHC RBC AUTO-ENTMCNC: 33.9 G/DL (ref 33–37)
MCV RBC AUTO: 81.6 FL (ref 80–99)
MONOCYTES # BLD AUTO: 0.6 10*3/MM3 (ref 0.1–0.6)
MONOCYTES NFR BLD AUTO: 6 % (ref 2–9)
NEUTROPHILS # BLD AUTO: 6.4 10*3/MM3 (ref 1.4–6.5)
NEUTROPHILS NFR BLD AUTO: 65.7 % (ref 42–75)
PLATELET # BLD AUTO: 306 10*3/MM3 (ref 150–450)
PMV BLD AUTO: 8.6 FL (ref 7.1–10.5)
RBC # BLD AUTO: 4.12 10*6/MM3 (ref 4–6)
WBC NRBC COR # BLD: 9.8 10*3/MM3 (ref 4.5–10)

## 2018-06-06 PROCEDURE — 36415 COLL VENOUS BLD VENIPUNCTURE: CPT | Performed by: NURSE PRACTITIONER

## 2018-06-06 PROCEDURE — 85025 COMPLETE CBC W/AUTO DIFF WBC: CPT | Performed by: NURSE PRACTITIONER

## 2018-06-06 PROCEDURE — 99213 OFFICE O/P EST LOW 20 MIN: CPT | Performed by: NURSE PRACTITIONER

## 2018-06-06 RX ORDER — METHYLPREDNISOLONE 4 MG/1
TABLET ORAL
Qty: 1 EACH | Refills: 0 | Status: SHIPPED | OUTPATIENT
Start: 2018-06-06 | End: 2018-08-08

## 2018-06-06 RX ORDER — CEFUROXIME AXETIL 250 MG/1
250 TABLET ORAL 2 TIMES DAILY
Qty: 20 TABLET | Refills: 0 | Status: SHIPPED | OUTPATIENT
Start: 2018-06-06 | End: 2018-08-08

## 2018-06-06 NOTE — TELEPHONE ENCOUNTER
----- Message from ALETA Mora sent at 6/6/2018 11:56 AM EDT -----  Please call patient with results. WBC is normal, his hemoglobin is still low, he needs to  occult card and return to office.    Unable to leave message,mailbox not set up yet

## 2018-06-06 NOTE — PATIENT INSTRUCTIONS
Start ceftin 250mg 2x day for 10 days.  Trial breo 1 puff daily.   Cont albuterol and nebulizer as needed educated about inhalers.   Medrol pack, take as directed, monitor BS and call with elevated readings.   Repeat cbc today,   Apply warm compress to eye, if symptoms persist 1-2 days advised to call his eye doctor.   If cough persists call office.   Increase fluid intake, get plenty of rest.   Patient agrees with plan of care and understands instructions. Call if worsening symptoms or any problems or concerns.

## 2018-06-06 NOTE — PROGRESS NOTES
Subjective   James Solis is a 68 y.o. male.     History of Present Illness   C/o cough, he was seen on 5/16/18, cxr normal, WBC elevated 13, he had sore throat and cough at that time, given amoxicillin, tessalon, states symptoms improved, then return 3 days after abx completed, he denies fever, he has noticed some wheezing, he denies smoking, he has used albuterol inhaler which helps, he has productive cough, thick and white in color, he tried mucinex. He states he cannot use flonase, he does use nebulizer at home as needed. He does have hx of DM.   He also c/o eye infection, left eye, noticed about 2-3 days ago, he sees eye doctor on [address], saw this year. He states he has cataracts, he states eye itches, noticed redness and swelling, he denies pain but has irritation, he is unsure if anything is in eye.     The following portions of the patient's history were reviewed and updated as appropriate: allergies, current medications, past family history, past medical history, past social history, past surgical history and problem list.    Review of Systems   Constitutional: Negative for chills, diaphoresis and fever.   HENT: Positive for congestion. Negative for ear pain, postnasal drip, rhinorrhea, sinus pressure and sore throat.    Eyes: Positive for itching. Negative for photophobia, pain, discharge, redness and visual disturbance.   Respiratory: Positive for cough and wheezing. Negative for shortness of breath.    Cardiovascular: Negative for chest pain.   Musculoskeletal: Negative for arthralgias and myalgias.   Skin: Negative for pallor.   Allergic/Immunologic: Positive for environmental allergies.   Neurological: Negative for dizziness, light-headedness and headache.   All other systems reviewed and are negative.      Objective   Physical Exam   Constitutional: He is oriented to person, place, and time. He appears well-developed and well-nourished.   HENT:   Head: Normocephalic.   Right Ear: Tympanic  membrane and ear canal normal.   Left Ear: Tympanic membrane and ear canal normal.   Nose: Nose normal.   Mouth/Throat: Uvula is midline, oropharynx is clear and moist and mucous membranes are normal.   Eyes: Conjunctivae and EOM are normal. Pupils are equal, round, and reactive to light. Left eye exhibits hordeolum.   Neck: Normal range of motion.   Cardiovascular: Normal rate, regular rhythm and normal heart sounds.    Pulmonary/Chest: Effort normal. No respiratory distress. He has no decreased breath sounds. He has wheezes in the right lower field. He has no rhonchi. He has no rales.   Musculoskeletal: Normal range of motion.   Lymphadenopathy:     He has no cervical adenopathy.   Neurological: He is alert and oriented to person, place, and time.   Skin: Skin is warm and dry.   Psychiatric: He has a normal mood and affect. His behavior is normal.   Nursing note and vitals reviewed.        Assessment/Plan   James was seen today for uri.    Diagnoses and all orders for this visit:    Hordeolum externum of left upper eyelid    Cough  -     CBC & Differential  -     CBC Auto Differential    Other orders  -     MethylPREDNISolone (MEDROL, DARIAN,) 4 MG tablet; Take as directed on package instructions.  -     cefuroxime (CEFTIN) 250 MG tablet; Take 1 tablet by mouth 2 (Two) Times a Day. X 10 d        Start ceftin 250mg 2x day for 10 days.  Trial breo 1 puff daily.   Cont albuterol and nebulizer as needed educated about inhalers.   Medrol pack, take as directed, monitor BS and call with elevated readings.   Repeat cbc today,   Apply warm compress to eye, if symptoms persist 1-2 days advised to call his eye doctor.   If cough persists call office.   Increase fluid intake, get plenty of rest.   Patient agrees with plan of care and understands instructions. Call if worsening symptoms or any problems or concerns.

## 2018-06-07 ENCOUNTER — TELEPHONE (OUTPATIENT)
Dept: FAMILY MEDICINE CLINIC | Facility: CLINIC | Age: 68
End: 2018-06-07

## 2018-06-07 NOTE — TELEPHONE ENCOUNTER
----- Message from ALETA Mora sent at 6/6/2018 11:56 AM EDT -----  Please call patient with results. WBC is normal, his hemoglobin is still low, he needs to  occult card and return to office.    Unable to leave message

## 2018-06-08 ENCOUNTER — TELEPHONE (OUTPATIENT)
Dept: FAMILY MEDICINE CLINIC | Facility: CLINIC | Age: 68
End: 2018-06-08

## 2018-06-08 NOTE — TELEPHONE ENCOUNTER
----- Message from ALETA Mora sent at 6/6/2018 11:56 AM EDT -----  Please call patient with results. WBC is normal, his hemoglobin is still low, he needs to  occult card and return to office.    Pt informed of lab results

## 2018-06-11 RX ORDER — SIMVASTATIN 40 MG
TABLET ORAL
Qty: 30 TABLET | Refills: 3 | Status: SHIPPED | OUTPATIENT
Start: 2018-06-11 | End: 2018-11-01

## 2018-06-11 RX ORDER — GLIMEPIRIDE 4 MG/1
TABLET ORAL
Qty: 60 TABLET | Refills: 3 | Status: SHIPPED | OUTPATIENT
Start: 2018-06-11 | End: 2018-10-02 | Stop reason: SDUPTHER

## 2018-06-11 RX ORDER — CITALOPRAM 40 MG/1
TABLET ORAL
Qty: 30 TABLET | Refills: 3 | Status: SHIPPED | OUTPATIENT
Start: 2018-06-11

## 2018-07-06 ENCOUNTER — TELEPHONE (OUTPATIENT)
Dept: FAMILY MEDICINE CLINIC | Facility: CLINIC | Age: 68
End: 2018-07-06

## 2018-08-08 ENCOUNTER — OFFICE VISIT (OUTPATIENT)
Dept: FAMILY MEDICINE CLINIC | Facility: CLINIC | Age: 68
End: 2018-08-08

## 2018-08-08 VITALS
BODY MASS INDEX: 38.3 KG/M2 | SYSTOLIC BLOOD PRESSURE: 120 MMHG | WEIGHT: 244 LBS | RESPIRATION RATE: 16 BRPM | HEART RATE: 71 BPM | TEMPERATURE: 98.5 F | HEIGHT: 67 IN | DIASTOLIC BLOOD PRESSURE: 70 MMHG | OXYGEN SATURATION: 100 %

## 2018-08-08 DIAGNOSIS — J44.1 CHRONIC OBSTRUCTIVE PULMONARY DISEASE WITH ACUTE EXACERBATION (HCC): ICD-10-CM

## 2018-08-08 DIAGNOSIS — E78.2 MIXED HYPERLIPIDEMIA: ICD-10-CM

## 2018-08-08 DIAGNOSIS — I10 ESSENTIAL HYPERTENSION: Primary | ICD-10-CM

## 2018-08-08 DIAGNOSIS — Z12.5 PROSTATE CANCER SCREENING: ICD-10-CM

## 2018-08-08 DIAGNOSIS — E55.9 VITAMIN D DEFICIENCY: ICD-10-CM

## 2018-08-08 DIAGNOSIS — Z12.11 COLON CANCER SCREENING: ICD-10-CM

## 2018-08-08 DIAGNOSIS — E11.610 TYPE 2 DIABETES MELLITUS WITH DIABETIC NEUROPATHIC ARTHROPATHY, WITHOUT LONG-TERM CURRENT USE OF INSULIN (HCC): ICD-10-CM

## 2018-08-08 LAB
25(OH)D3 SERPL-MCNC: 17.9 NG/ML (ref 30–100)
ALBUMIN SERPL-MCNC: 4.3 G/DL (ref 3.5–5.2)
ALBUMIN/GLOB SERPL: 1.8 G/DL
ALP SERPL-CCNC: 51 U/L (ref 39–117)
ALT SERPL W P-5'-P-CCNC: 15 U/L (ref 1–41)
ANION GAP SERPL CALCULATED.3IONS-SCNC: 12.8 MMOL/L
AST SERPL-CCNC: 11 U/L (ref 1–40)
BILIRUB SERPL-MCNC: 0.3 MG/DL (ref 0.1–1.2)
BUN BLD-MCNC: 20 MG/DL (ref 8–23)
BUN/CREAT SERPL: 19.2 (ref 7–25)
CALCIUM SPEC-SCNC: 9.2 MG/DL (ref 8.6–10.5)
CHLORIDE SERPL-SCNC: 106 MMOL/L (ref 98–107)
CHOLEST SERPL-MCNC: 193 MG/DL (ref 0–200)
CO2 SERPL-SCNC: 25.2 MMOL/L (ref 22–29)
CREAT BLD-MCNC: 1.04 MG/DL (ref 0.76–1.27)
ERYTHROCYTE [DISTWIDTH] IN BLOOD BY AUTOMATED COUNT: 15.6 % (ref 4.5–15)
GFR SERPL CREATININE-BSD FRML MDRD: 71 ML/MIN/1.73
GLOBULIN UR ELPH-MCNC: 2.4 GM/DL
GLUCOSE BLD-MCNC: 143 MG/DL (ref 65–99)
HBA1C MFR BLD: 6.8 % (ref 4.8–5.6)
HCT VFR BLD AUTO: 36.1 % (ref 35–60)
HDLC SERPL-MCNC: 45 MG/DL (ref 40–60)
HGB BLD-MCNC: 11.9 G/DL (ref 13.5–18)
LDLC SERPL CALC-MCNC: 119 MG/DL (ref 0–100)
LDLC/HDLC SERPL: 2.64 {RATIO}
LYMPHOCYTES # BLD AUTO: 2.5 10*3/MM3 (ref 1.2–3.4)
LYMPHOCYTES NFR BLD AUTO: 32.5 % (ref 21–51)
MCH RBC QN AUTO: 26.8 PG (ref 26.1–33.1)
MCHC RBC AUTO-ENTMCNC: 32.9 G/DL (ref 33–37)
MCV RBC AUTO: 81.4 FL (ref 80–99)
MONOCYTES # BLD AUTO: 0.5 10*3/MM3 (ref 0.1–0.6)
MONOCYTES NFR BLD AUTO: 6.4 % (ref 2–9)
NEUTROPHILS # BLD AUTO: 4.8 10*3/MM3 (ref 1.4–6.5)
NEUTROPHILS NFR BLD AUTO: 61.1 % (ref 42–75)
PLATELET # BLD AUTO: 291 10*3/MM3 (ref 150–450)
PMV BLD AUTO: 9.2 FL (ref 7.1–10.5)
POTASSIUM BLD-SCNC: 4.5 MMOL/L (ref 3.5–5.2)
PROT SERPL-MCNC: 6.7 G/DL (ref 6–8.5)
PSA SERPL-MCNC: 0.77 NG/ML (ref 0–4)
RBC # BLD AUTO: 4.43 10*6/MM3 (ref 4–6)
SODIUM BLD-SCNC: 144 MMOL/L (ref 136–145)
TRIGL SERPL-MCNC: 147 MG/DL (ref 0–150)
VLDLC SERPL-MCNC: 29.4 MG/DL (ref 5–40)
WBC NRBC COR # BLD: 7.8 10*3/MM3 (ref 4.5–10)

## 2018-08-08 PROCEDURE — 83036 HEMOGLOBIN GLYCOSYLATED A1C: CPT | Performed by: INTERNAL MEDICINE

## 2018-08-08 PROCEDURE — 85025 COMPLETE CBC W/AUTO DIFF WBC: CPT | Performed by: INTERNAL MEDICINE

## 2018-08-08 PROCEDURE — 80053 COMPREHEN METABOLIC PANEL: CPT | Performed by: INTERNAL MEDICINE

## 2018-08-08 PROCEDURE — 80061 LIPID PANEL: CPT | Performed by: INTERNAL MEDICINE

## 2018-08-08 PROCEDURE — G0103 PSA SCREENING: HCPCS | Performed by: INTERNAL MEDICINE

## 2018-08-08 PROCEDURE — 99214 OFFICE O/P EST MOD 30 MIN: CPT | Performed by: INTERNAL MEDICINE

## 2018-08-08 PROCEDURE — 82306 VITAMIN D 25 HYDROXY: CPT | Performed by: INTERNAL MEDICINE

## 2018-08-08 PROCEDURE — 36415 COLL VENOUS BLD VENIPUNCTURE: CPT | Performed by: INTERNAL MEDICINE

## 2018-08-08 NOTE — PROGRESS NOTES
Subjective   James Solis is a 68 y.o. male.     History of Present Illness   Patient was here for hypertension.  Blood pressures been running 120s over 80s.  His lipids a been controlled with his statin.  She does have diabetes and labs are being drawn today.  Patient has not been checking his sugar at home.  Patient does see the ophthalmologist yearly.  COPD is been well-controlled with his bronchodilators.  Labs were scheduled patient will follow-up in 4 days.  Patient return to clinic in 4 months.    Dictated utilizing Dragon dictation. If there are questions or for further clarification, please contact me.   The following portions of the patient's history were reviewed and updated as appropriate: allergies, current medications, past family history, past medical history, past social history, past surgical history and problem list.    Review of Systems   Constitutional: Negative for fatigue and fever.   HENT: Positive for congestion. Negative for trouble swallowing.    Eyes: Negative for discharge and visual disturbance.   Respiratory: Negative for choking and shortness of breath.    Cardiovascular: Negative for chest pain and palpitations.   Gastrointestinal: Negative for abdominal pain and blood in stool.   Endocrine: Negative.    Genitourinary: Negative for genital sores and hematuria.   Musculoskeletal: Negative for gait problem and joint swelling.   Skin: Negative for color change, pallor, rash and wound.   Allergic/Immunologic: Positive for environmental allergies. Negative for immunocompromised state.   Neurological: Negative for facial asymmetry and speech difficulty.   Psychiatric/Behavioral: Negative for hallucinations and suicidal ideas.       Objective   Physical Exam   Constitutional: He is oriented to person, place, and time. He appears well-developed and well-nourished.   HENT:   Head: Normocephalic.   Eyes: Pupils are equal, round, and reactive to light. Conjunctivae are normal.   Neck: Normal  range of motion. Neck supple.   Cardiovascular: Normal rate, regular rhythm and normal heart sounds.    Pulmonary/Chest: Effort normal and breath sounds normal.   Abdominal: Soft. Bowel sounds are normal.   Musculoskeletal: Normal range of motion.   Neurological: He is alert and oriented to person, place, and time.   Skin: Skin is warm and dry.   Psychiatric: He has a normal mood and affect. His behavior is normal. Judgment and thought content normal.   Nursing note and vitals reviewed.      Assessment/Plan   Problems Addressed this Visit        Cardiovascular and Mediastinum    Hypertension - Primary    Relevant Orders    CBC & Differential (Completed)    Comprehensive Metabolic Panel    Hemoglobin A1c    PSA Screen    Vitamin D 25 Hydroxy    Lipid Panel    CBC Auto Differential (Completed)    Hyperlipidemia    Relevant Orders    CBC & Differential (Completed)    Comprehensive Metabolic Panel    Hemoglobin A1c    PSA Screen    Vitamin D 25 Hydroxy    Lipid Panel    CBC Auto Differential (Completed)       Respiratory    COPD (chronic obstructive pulmonary disease) (CMS/[facility])    Relevant Orders    CBC & Differential (Completed)    Comprehensive Metabolic Panel    Hemoglobin A1c    PSA Screen    Vitamin D 25 Hydroxy    Lipid Panel    CBC Auto Differential (Completed)       Endocrine    Type 2 diabetes mellitus with diabetic arthropathy (CMS/[facility])    Relevant Orders    CBC & Differential (Completed)    Comprehensive Metabolic Panel    Hemoglobin A1c    PSA Screen    Vitamin D 25 Hydroxy    Lipid Panel    CBC Auto Differential (Completed)       Other    Prostate cancer screening    Relevant Orders    CBC & Differential (Completed)    Comprehensive Metabolic Panel    Hemoglobin A1c    PSA Screen    Vitamin D 25 Hydroxy    Lipid Panel    CBC Auto Differential (Completed)      Other Visit Diagnoses     Colon cancer screening        Relevant Orders    Ambulatory Referral to Gastroenterology    Vitamin D deficiency          Relevant Orders    Vitamin D 25 Hydroxy

## 2018-08-09 RX ORDER — ERGOCALCIFEROL 1.25 MG/1
CAPSULE ORAL
Qty: 24 CAPSULE | Refills: 3 | Status: SHIPPED | OUTPATIENT
Start: 2018-08-09 | End: 2019-05-15 | Stop reason: SDUPTHER

## 2018-08-27 RX ORDER — AMLODIPINE BESYLATE 10 MG/1
TABLET ORAL
Qty: 30 TABLET | Refills: 0 | Status: SHIPPED | OUTPATIENT
Start: 2018-08-27 | End: 2018-10-02 | Stop reason: SDUPTHER

## 2018-08-29 ENCOUNTER — TELEPHONE (OUTPATIENT)
Dept: FAMILY MEDICINE CLINIC | Facility: CLINIC | Age: 68
End: 2018-08-29

## 2018-08-30 RX ORDER — IBUPROFEN 800 MG/1
800 TABLET ORAL 3 TIMES DAILY PRN
Qty: 90 TABLET | Refills: 1 | Status: SHIPPED | OUTPATIENT
Start: 2018-08-30 | End: 2019-09-28 | Stop reason: SDUPTHER

## 2018-10-02 RX ORDER — GLIMEPIRIDE 4 MG/1
4 TABLET ORAL 2 TIMES DAILY
Qty: 60 TABLET | Refills: 3 | Status: SHIPPED | OUTPATIENT
Start: 2018-10-02 | End: 2019-01-22 | Stop reason: SDUPTHER

## 2018-10-02 RX ORDER — AMLODIPINE BESYLATE 10 MG/1
TABLET ORAL
Qty: 30 TABLET | Refills: 0 | Status: SHIPPED | OUTPATIENT
Start: 2018-10-02 | End: 2018-11-03 | Stop reason: SDUPTHER

## 2018-10-10 ENCOUNTER — OFFICE VISIT (OUTPATIENT)
Dept: FAMILY MEDICINE CLINIC | Facility: CLINIC | Age: 68
End: 2018-10-10

## 2018-10-10 VITALS
HEART RATE: 65 BPM | HEIGHT: 67 IN | BODY MASS INDEX: 37.67 KG/M2 | SYSTOLIC BLOOD PRESSURE: 140 MMHG | OXYGEN SATURATION: 100 % | WEIGHT: 240 LBS | TEMPERATURE: 98.6 F | DIASTOLIC BLOOD PRESSURE: 78 MMHG | RESPIRATION RATE: 15 BRPM

## 2018-10-10 DIAGNOSIS — E78.2 MIXED HYPERLIPIDEMIA: ICD-10-CM

## 2018-10-10 DIAGNOSIS — I25.9 CHEST PAIN DUE TO MYOCARDIAL ISCHEMIA, UNSPECIFIED ISCHEMIC CHEST PAIN TYPE: ICD-10-CM

## 2018-10-10 DIAGNOSIS — E11.618 TYPE 2 DIABETES MELLITUS WITH OTHER DIABETIC ARTHROPATHY, UNSPECIFIED WHETHER LONG TERM INSULIN USE (HCC): ICD-10-CM

## 2018-10-10 DIAGNOSIS — M87.052 AVASCULAR NECROSIS OF BONES OF BOTH HIPS (HCC): ICD-10-CM

## 2018-10-10 DIAGNOSIS — M87.051 AVASCULAR NECROSIS OF BONES OF BOTH HIPS (HCC): ICD-10-CM

## 2018-10-10 DIAGNOSIS — Z01.810 ENCOUNTER FOR PREPROCEDURAL CARDIOVASCULAR EXAMINATION: ICD-10-CM

## 2018-10-10 DIAGNOSIS — I10 ESSENTIAL HYPERTENSION: Primary | ICD-10-CM

## 2018-10-10 PROCEDURE — 99214 OFFICE O/P EST MOD 30 MIN: CPT | Performed by: INTERNAL MEDICINE

## 2018-10-10 PROCEDURE — 90662 IIV NO PRSV INCREASED AG IM: CPT | Performed by: INTERNAL MEDICINE

## 2018-10-10 PROCEDURE — G0008 ADMIN INFLUENZA VIRUS VAC: HCPCS | Performed by: INTERNAL MEDICINE

## 2018-10-10 PROCEDURE — 93000 ELECTROCARDIOGRAM COMPLETE: CPT | Performed by: INTERNAL MEDICINE

## 2018-10-10 NOTE — PROGRESS NOTES
Procedure     ECG 12 Lead  Date/Time: 10/10/2018 1:26 PM  Performed by: MARIANO FENTON  Authorized by: MARIANO FENTON   Comparison: not compared with previous ECG   Rhythm: sinus bradycardia  Conduction: conduction normal  ST Segments: ST segments normal  T Waves: T waves normal  Other: no other findings  Clinical impression: normal ECG  Comments: EKG Interpretation Report    Heart rate:    55 beats/min, DC interval:  186 msec, QRS duration:  98 msec  QTu:425 msec, QTc:  415 msec

## 2018-10-10 NOTE — PROGRESS NOTES
Subjective   James Solis is a 68 y.o. male.     History of Present Illness   She was seen today for hypertension.  Pressures been running 130s over 80s.  His blood sugars have been under control with his medication.  Patient is having surgery for his avascular necrosis of both hips.  His EKG was essentially normal.  Patient had a stress test last year that showed ejection fraction 45% with concentric ventricular hypertrophy.  The test was repeated and if no change will improve his surgery.  Lipid status controlled with his statin.  Patient will follow-up after stress test.    Dictated utilizing Dragon dictation. If there are questions or for further clarification, please contact me.   The following portions of the patient's history were reviewed and updated as appropriate: allergies, current medications, past family history, past medical history, past social history, past surgical history and problem list.    Review of Systems   Constitutional: Negative for fatigue and fever.   HENT: Positive for congestion. Negative for trouble swallowing.    Eyes: Negative for discharge and visual disturbance.   Respiratory: Negative for choking and shortness of breath.    Cardiovascular: Negative for chest pain and palpitations.   Gastrointestinal: Negative for abdominal pain and blood in stool.   Endocrine: Negative.    Genitourinary: Negative for genital sores and hematuria.   Musculoskeletal: Negative for gait problem and joint swelling.   Skin: Negative for color change, pallor, rash and wound.   Allergic/Immunologic: Positive for environmental allergies. Negative for immunocompromised state.   Neurological: Negative for facial asymmetry and speech difficulty.   Psychiatric/Behavioral: Negative for hallucinations and suicidal ideas.       Objective   Physical Exam   Constitutional: He is oriented to person, place, and time. He appears well-developed and well-nourished.   HENT:   Head: Normocephalic.   Eyes: Pupils are equal,  round, and reactive to light. Conjunctivae are normal.   Neck: Normal range of motion. Neck supple.   Cardiovascular: Normal rate, regular rhythm and normal heart sounds.  Exam reveals no gallop and no friction rub.    No murmur heard.  Pulmonary/Chest: Effort normal and breath sounds normal. He has no wheezes. He has no rales. He exhibits no tenderness.   Abdominal: Soft. Bowel sounds are normal.   Musculoskeletal: Normal range of motion.   Neurological: He is alert and oriented to person, place, and time.   Skin: Skin is warm and dry.   Psychiatric: He has a normal mood and affect. His behavior is normal. Judgment and thought content normal.   Nursing note and vitals reviewed.      Assessment/Plan   Problems Addressed this Visit        Cardiovascular and Mediastinum    Hypertension - Primary    Hyperlipidemia       Endocrine    Type 2 diabetes mellitus with diabetic arthropathy (CMS/HCC)       Musculoskeletal and Integument    Avascular necrosis of bones of both hips (CMS/HCC)      Other Visit Diagnoses     Chest pain due to myocardial ischemia, unspecified ischemic chest pain type        Relevant Orders    ECG 12 Lead    Stress test with myocardial perfusion    Encounter for preprocedural cardiovascular examination         Relevant Orders    Stress test with myocardial perfusion

## 2018-10-11 ENCOUNTER — TELEPHONE (OUTPATIENT)
Dept: FAMILY MEDICINE CLINIC | Facility: CLINIC | Age: 68
End: 2018-10-11

## 2018-10-11 NOTE — TELEPHONE ENCOUNTER
PATIENT IS HAVING A TEST DONE TOMORROW AND DR. FENTON WANTS TO SEE PATIENT 4 DAYS AFTER TEST. WHEN CAN HE BE WORKED IN

## 2018-10-12 ENCOUNTER — DOCUMENTATION (OUTPATIENT)
Dept: FAMILY MEDICINE CLINIC | Facility: CLINIC | Age: 68
End: 2018-10-12

## 2018-10-12 ENCOUNTER — HOSPITAL ENCOUNTER (OUTPATIENT)
Dept: CARDIOLOGY | Facility: HOSPITAL | Age: 68
Discharge: HOME | End: 2018-10-12
Admitting: INTERNAL MEDICINE

## 2018-10-12 ENCOUNTER — TELEPHONE (OUTPATIENT)
Dept: FAMILY MEDICINE CLINIC | Facility: CLINIC | Age: 68
End: 2018-10-12

## 2018-10-12 VITALS — WEIGHT: 240 LBS | HEIGHT: 67 IN | BODY MASS INDEX: 37.67 KG/M2

## 2018-10-12 DIAGNOSIS — Z01.810 ENCOUNTER FOR PREPROCEDURAL CARDIOVASCULAR EXAMINATION: ICD-10-CM

## 2018-10-12 DIAGNOSIS — I25.9 CHEST PAIN DUE TO MYOCARDIAL ISCHEMIA, UNSPECIFIED ISCHEMIC CHEST PAIN TYPE: ICD-10-CM

## 2018-10-12 LAB
BH CV NUCLEAR PRIOR STUDY: 3
BH CV STRESS BP STAGE 1: NORMAL
BH CV STRESS COMMENTS STAGE 1: NORMAL
BH CV STRESS DOSE REGADENOSON STAGE 1: 0.4
BH CV STRESS DURATION MIN STAGE 1: 0
BH CV STRESS DURATION SEC STAGE 1: 10
BH CV STRESS HR STAGE 1: 81
BH CV STRESS PROTOCOL 1: NORMAL
BH CV STRESS RECOVERY BP: NORMAL MMHG
BH CV STRESS RECOVERY HR: 74 BPM
BH CV STRESS STAGE 1: 1
LV EF NUC BP: 56 %
MAXIMAL PREDICTED HEART RATE: 151 BPM
PERCENT MAX PREDICTED HR: 53.64 %
STRESS BASELINE BP: NORMAL MMHG
STRESS BASELINE HR: 62 BPM
STRESS PERCENT HR: 63 %
STRESS POST EXERCISE DUR SEC: 10 SEC
STRESS POST PEAK BP: NORMAL MMHG
STRESS POST PEAK HR: 81 BPM
STRESS TARGET HR: 128 BPM

## 2018-10-12 PROCEDURE — 78452 HT MUSCLE IMAGE SPECT MULT: CPT

## 2018-10-12 PROCEDURE — 0 TECHNETIUM TETROFOSMIN KIT: Performed by: INTERNAL MEDICINE

## 2018-10-12 PROCEDURE — 93016 CV STRESS TEST SUPVJ ONLY: CPT | Performed by: INTERNAL MEDICINE

## 2018-10-12 PROCEDURE — 93017 CV STRESS TEST TRACING ONLY: CPT

## 2018-10-12 PROCEDURE — 93018 CV STRESS TEST I&R ONLY: CPT | Performed by: INTERNAL MEDICINE

## 2018-10-12 PROCEDURE — 25010000002 REGADENOSON 0.4 MG/5ML SOLUTION: Performed by: INTERNAL MEDICINE

## 2018-10-12 PROCEDURE — 78452 HT MUSCLE IMAGE SPECT MULT: CPT | Performed by: INTERNAL MEDICINE

## 2018-10-12 PROCEDURE — A9502 TC99M TETROFOSMIN: HCPCS | Performed by: INTERNAL MEDICINE

## 2018-10-12 RX ADMIN — REGADENOSON 0.4 MG: 0.08 INJECTION, SOLUTION INTRAVENOUS at 08:55

## 2018-10-12 RX ADMIN — TETROFOSMIN 1 DOSE: 1.38 INJECTION, POWDER, LYOPHILIZED, FOR SOLUTION INTRAVENOUS at 08:55

## 2018-10-12 RX ADMIN — TETROFOSMIN 1 DOSE: 1.38 INJECTION, POWDER, LYOPHILIZED, FOR SOLUTION INTRAVENOUS at 07:55

## 2018-11-01 ENCOUNTER — TELEPHONE (OUTPATIENT)
Dept: FAMILY MEDICINE CLINIC | Facility: CLINIC | Age: 68
End: 2018-11-01

## 2018-11-01 RX ORDER — ROSUVASTATIN CALCIUM 20 MG/1
20 TABLET, COATED ORAL DAILY
Qty: 30 TABLET | Refills: 3 | Status: SHIPPED | OUTPATIENT
Start: 2018-11-01 | End: 2018-12-20 | Stop reason: ALTCHOICE

## 2018-11-01 NOTE — TELEPHONE ENCOUNTER
Home health nurse states that patient should not be taking simvastatin and amlodipine together please advise.

## 2018-11-05 ENCOUNTER — TELEPHONE (OUTPATIENT)
Dept: FAMILY MEDICINE CLINIC | Facility: CLINIC | Age: 68
End: 2018-11-05

## 2018-11-05 RX ORDER — AMLODIPINE BESYLATE 10 MG/1
TABLET ORAL
Qty: 30 TABLET | Refills: 3 | Status: SHIPPED | OUTPATIENT
Start: 2018-11-05 | End: 2019-03-03 | Stop reason: SDUPTHER

## 2018-11-05 NOTE — TELEPHONE ENCOUNTER
PLEASE CALL PATIENTS SISTER, BC THERE IS SOME CONFUSION ON HIS MEDICATION AND IT HASN'T BEEN CALLED IN OR IT HAS BEEN AND WASN'T RECEIVED A CHANGE IN MEDICATION, BC OF SIDE EFFECTS, AND SHE NEEDS TO TALK TO HER IMMEDIATELY.

## 2018-11-05 NOTE — TELEPHONE ENCOUNTER
Rosalina concerned about patients blood sugar has been running 168, 127, 126, and wants to know if ok to stay on current Diabetic meds because ortho dr seem to be concerned about it and wants him to be on insulin, patient had total knee replaced on Monday. I told her I would check with RLS.

## 2018-11-06 ENCOUNTER — TELEPHONE (OUTPATIENT)
Dept: FAMILY MEDICINE CLINIC | Facility: CLINIC | Age: 68
End: 2018-11-06

## 2018-11-06 RX ORDER — LISINOPRIL 40 MG/1
40 TABLET ORAL DAILY
Qty: 30 TABLET | Refills: 3 | Status: SHIPPED | OUTPATIENT
Start: 2018-11-06 | End: 2019-03-28 | Stop reason: SDUPTHER

## 2018-11-12 ENCOUNTER — TELEPHONE (OUTPATIENT)
Dept: FAMILY MEDICINE CLINIC | Facility: CLINIC | Age: 68
End: 2018-11-12

## 2018-11-12 NOTE — TELEPHONE ENCOUNTER
Is calling about a high blood pressure it was 170/78 at rest however he had not had his bp medication yet, is taking it now and he did not have any symptoms of it being high. This is an fyi

## 2018-11-14 ENCOUNTER — TELEPHONE (OUTPATIENT)
Dept: FAMILY MEDICINE CLINIC | Facility: CLINIC | Age: 68
End: 2018-11-14

## 2018-12-20 ENCOUNTER — OFFICE VISIT (OUTPATIENT)
Dept: FAMILY MEDICINE CLINIC | Facility: CLINIC | Age: 68
End: 2018-12-20

## 2018-12-20 VITALS
HEART RATE: 82 BPM | SYSTOLIC BLOOD PRESSURE: 148 MMHG | OXYGEN SATURATION: 99 % | WEIGHT: 232.4 LBS | DIASTOLIC BLOOD PRESSURE: 70 MMHG | TEMPERATURE: 98.2 F | HEIGHT: 67 IN | BODY MASS INDEX: 36.47 KG/M2

## 2018-12-20 DIAGNOSIS — E55.9 VITAMIN D DEFICIENCY: ICD-10-CM

## 2018-12-20 DIAGNOSIS — E11.610 TYPE 2 DIABETES MELLITUS WITH DIABETIC NEUROPATHIC ARTHROPATHY, WITHOUT LONG-TERM CURRENT USE OF INSULIN (HCC): Primary | ICD-10-CM

## 2018-12-20 DIAGNOSIS — E78.2 MIXED HYPERLIPIDEMIA: ICD-10-CM

## 2018-12-20 DIAGNOSIS — I10 ESSENTIAL HYPERTENSION: ICD-10-CM

## 2018-12-20 LAB
25(OH)D3 SERPL-MCNC: 20.1 NG/ML (ref 30–100)
ALBUMIN SERPL-MCNC: 4.3 G/DL (ref 3.5–5.2)
ALBUMIN/GLOB SERPL: 1.3 G/DL
ALP SERPL-CCNC: 70 U/L (ref 39–117)
ALT SERPL W P-5'-P-CCNC: 12 U/L (ref 1–41)
ANION GAP SERPL CALCULATED.3IONS-SCNC: 11.2 MMOL/L
AST SERPL-CCNC: 17 U/L (ref 1–40)
BILIRUB SERPL-MCNC: 0.3 MG/DL (ref 0.1–1.2)
BUN BLD-MCNC: 15 MG/DL (ref 8–23)
BUN/CREAT SERPL: 11.1 (ref 7–25)
CALCIUM SPEC-SCNC: 10 MG/DL (ref 8.6–10.5)
CHLORIDE SERPL-SCNC: 101 MMOL/L (ref 98–107)
CHOLEST SERPL-MCNC: 171 MG/DL (ref 0–200)
CO2 SERPL-SCNC: 23.8 MMOL/L (ref 22–29)
CREAT BLD-MCNC: 1.35 MG/DL (ref 0.76–1.27)
ERYTHROCYTE [DISTWIDTH] IN BLOOD BY AUTOMATED COUNT: 15.3 % (ref 4.5–15)
GFR SERPL CREATININE-BSD FRML MDRD: 52 ML/MIN/1.73
GLOBULIN UR ELPH-MCNC: 3.3 GM/DL
GLUCOSE BLD-MCNC: 111 MG/DL (ref 65–99)
HBA1C MFR BLD: 6.8 % (ref 4.8–5.6)
HCT VFR BLD AUTO: 38.1 % (ref 35–60)
HDLC SERPL-MCNC: 47 MG/DL (ref 40–60)
HGB BLD-MCNC: 12.1 G/DL (ref 13.5–18)
LDLC SERPL CALC-MCNC: 90 MG/DL (ref 0–100)
LDLC/HDLC SERPL: 1.91 {RATIO}
LYMPHOCYTES # BLD AUTO: 3.3 10*3/MM3 (ref 1.2–3.4)
LYMPHOCYTES NFR BLD AUTO: 30.7 % (ref 21–51)
MCH RBC QN AUTO: 26.7 PG (ref 26.1–33.1)
MCHC RBC AUTO-ENTMCNC: 31.9 G/DL (ref 33–37)
MCV RBC AUTO: 83.8 FL (ref 80–99)
MONOCYTES # BLD AUTO: 0.2 10*3/MM3 (ref 0.1–0.6)
MONOCYTES NFR BLD AUTO: 1.9 % (ref 2–9)
NEUTROPHILS # BLD AUTO: 7.3 10*3/MM3 (ref 1.4–6.5)
NEUTROPHILS NFR BLD AUTO: 67.4 % (ref 42–75)
PLATELET # BLD AUTO: 363 10*3/MM3 (ref 150–450)
PMV BLD AUTO: 8.1 FL (ref 7.1–10.5)
POTASSIUM BLD-SCNC: 4.1 MMOL/L (ref 3.5–5.2)
PROT SERPL-MCNC: 7.6 G/DL (ref 6–8.5)
RBC # BLD AUTO: 4.54 10*6/MM3 (ref 4–6)
SODIUM BLD-SCNC: 136 MMOL/L (ref 136–145)
TRIGL SERPL-MCNC: 172 MG/DL (ref 0–150)
VLDLC SERPL-MCNC: 34.4 MG/DL (ref 5–40)
WBC NRBC COR # BLD: 10.8 10*3/MM3 (ref 4.5–10)

## 2018-12-20 PROCEDURE — 80061 LIPID PANEL: CPT | Performed by: INTERNAL MEDICINE

## 2018-12-20 PROCEDURE — 36415 COLL VENOUS BLD VENIPUNCTURE: CPT | Performed by: INTERNAL MEDICINE

## 2018-12-20 PROCEDURE — 99214 OFFICE O/P EST MOD 30 MIN: CPT | Performed by: INTERNAL MEDICINE

## 2018-12-20 PROCEDURE — 80053 COMPREHEN METABOLIC PANEL: CPT | Performed by: INTERNAL MEDICINE

## 2018-12-20 PROCEDURE — 82306 VITAMIN D 25 HYDROXY: CPT | Performed by: INTERNAL MEDICINE

## 2018-12-20 PROCEDURE — 85025 COMPLETE CBC W/AUTO DIFF WBC: CPT | Performed by: INTERNAL MEDICINE

## 2018-12-20 PROCEDURE — 83036 HEMOGLOBIN GLYCOSYLATED A1C: CPT | Performed by: INTERNAL MEDICINE

## 2018-12-20 RX ORDER — OMEPRAZOLE 40 MG/1
40 CAPSULE, DELAYED RELEASE ORAL DAILY
Qty: 30 CAPSULE | Refills: 3 | Status: SHIPPED | OUTPATIENT
Start: 2018-12-20 | End: 2019-03-28 | Stop reason: SDUPTHER

## 2018-12-20 RX ORDER — ZOLPIDEM TARTRATE 10 MG/1
10 TABLET ORAL NIGHTLY PRN
Qty: 30 TABLET | Refills: 3 | Status: SHIPPED | OUTPATIENT
Start: 2018-12-20 | End: 2019-01-17 | Stop reason: ALTCHOICE

## 2018-12-20 RX ORDER — LEVALBUTEROL TARTRATE 45 UG/1
1-2 AEROSOL, METERED ORAL EVERY 4 HOURS PRN
COMMUNITY
End: 2020-01-02

## 2018-12-20 NOTE — PROGRESS NOTES
Subjective   James Solis is a 68 y.o. male.     History of Present Illness   Patient was seen for diabetes.  Blood sugars are running in the 120s.  His blood pressures running 130s over 80s.  Lipids have been controlled with diet and medication.  Patient had his medications refilled and was scheduled to return in 4 months.  He will have labs drawn before returning clinic.    Dictated utilizing Dragon dictation. If there are questions or for further clarification, please contact me.   The following portions of the patient's history were reviewed and updated as appropriate: allergies, current medications, past family history, past medical history, past social history, past surgical history and problem list.    Review of Systems   Constitutional: Negative for fatigue and fever.   HENT: Positive for congestion. Negative for trouble swallowing.    Eyes: Negative for discharge and visual disturbance.   Respiratory: Negative for choking and shortness of breath.    Cardiovascular: Negative for chest pain and palpitations.   Gastrointestinal: Negative for abdominal pain and blood in stool.   Endocrine: Negative.    Genitourinary: Negative for genital sores and hematuria.   Musculoskeletal: Negative for gait problem and joint swelling.   Skin: Negative for color change, pallor, rash and wound.   Allergic/Immunologic: Positive for environmental allergies. Negative for immunocompromised state.   Neurological: Negative for facial asymmetry and speech difficulty.   Psychiatric/Behavioral: Negative for hallucinations and suicidal ideas.       Objective   Physical Exam   Constitutional: He is oriented to person, place, and time. He appears well-developed and well-nourished.   HENT:   Head: Normocephalic.   Eyes: Conjunctivae are normal. Pupils are equal, round, and reactive to light.   Neck: Normal range of motion. Neck supple.   Cardiovascular: Normal rate, regular rhythm and normal heart sounds.   Pulmonary/Chest: Effort normal  and breath sounds normal.   Abdominal: Soft. Bowel sounds are normal.   Musculoskeletal: Normal range of motion.   Neurological: He is alert and oriented to person, place, and time.   Skin: Skin is warm and dry.   Psychiatric: He has a normal mood and affect. His behavior is normal. Judgment and thought content normal.   Nursing note and vitals reviewed.      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

## 2018-12-26 RX ORDER — LABETALOL 200 MG/1
TABLET, FILM COATED ORAL
Qty: 60 TABLET | Refills: 0 | Status: SHIPPED | OUTPATIENT
Start: 2018-12-26 | End: 2019-02-19 | Stop reason: SDUPTHER

## 2019-01-17 ENCOUNTER — OFFICE VISIT (OUTPATIENT)
Dept: FAMILY MEDICINE CLINIC | Facility: CLINIC | Age: 69
End: 2019-01-17

## 2019-01-17 VITALS
HEIGHT: 67 IN | DIASTOLIC BLOOD PRESSURE: 72 MMHG | BODY MASS INDEX: 36.47 KG/M2 | WEIGHT: 232.4 LBS | SYSTOLIC BLOOD PRESSURE: 168 MMHG | TEMPERATURE: 98.1 F | OXYGEN SATURATION: 100 % | HEART RATE: 72 BPM

## 2019-01-17 DIAGNOSIS — N39.0 URINARY TRACT INFECTION WITHOUT HEMATURIA, SITE UNSPECIFIED: ICD-10-CM

## 2019-01-17 DIAGNOSIS — Z79.4 TYPE 2 DIABETES MELLITUS WITH OTHER DIABETIC ARTHROPATHY, WITH LONG-TERM CURRENT USE OF INSULIN (HCC): ICD-10-CM

## 2019-01-17 DIAGNOSIS — E11.618 TYPE 2 DIABETES MELLITUS WITH OTHER DIABETIC ARTHROPATHY, WITH LONG-TERM CURRENT USE OF INSULIN (HCC): ICD-10-CM

## 2019-01-17 DIAGNOSIS — E78.2 MIXED HYPERLIPIDEMIA: ICD-10-CM

## 2019-01-17 DIAGNOSIS — I10 ESSENTIAL HYPERTENSION: Primary | ICD-10-CM

## 2019-01-17 LAB
BACTERIA UR QL AUTO: NORMAL /HPF
BILIRUB UR QL STRIP: NEGATIVE
CLARITY UR: CLEAR
COLOR UR: YELLOW
GLUCOSE UR STRIP-MCNC: ABNORMAL MG/DL
HGB UR QL STRIP.AUTO: ABNORMAL
KETONES UR QL STRIP: NEGATIVE
LEUKOCYTE ESTERASE UR QL STRIP.AUTO: NEGATIVE
NITRITE UR QL STRIP: NEGATIVE
PH UR STRIP.AUTO: 7 [PH] (ref 4.6–8)
PROT UR QL STRIP: ABNORMAL
RBC # UR: NORMAL /HPF
REF LAB TEST METHOD: NORMAL
SP GR UR STRIP: 1.02 (ref 1–1.03)
SQUAMOUS #/AREA URNS HPF: NORMAL /HPF
UROBILINOGEN UR QL STRIP: ABNORMAL
WBC UR QL AUTO: NORMAL /HPF

## 2019-01-17 PROCEDURE — 99214 OFFICE O/P EST MOD 30 MIN: CPT | Performed by: INTERNAL MEDICINE

## 2019-01-17 PROCEDURE — 81001 URINALYSIS AUTO W/SCOPE: CPT | Performed by: INTERNAL MEDICINE

## 2019-01-17 RX ORDER — ROSUVASTATIN CALCIUM 20 MG/1
20 TABLET, COATED ORAL DAILY
COMMUNITY
End: 2019-03-03 | Stop reason: SDUPTHER

## 2019-01-17 RX ORDER — MIRTAZAPINE 15 MG/1
15 TABLET, FILM COATED ORAL NIGHTLY
Qty: 30 TABLET | Refills: 3 | Status: SHIPPED | OUTPATIENT
Start: 2019-01-17 | End: 2019-02-07

## 2019-01-17 NOTE — PROGRESS NOTES
Subjective   James Solis is a 68 y.o. male.     History of Present Illness   Patient was seen for hypertension.  Blood pressures been running 130s over 80s at home.  His lipids a been controlled with statin.  Blood sugar also is been running in the 130s at home.  He did have polyuria and urinalysis indicated excessive blood sugar because of his SGOT 2.  He also had elevated protein and this will be followed.    Dictated utilizing Dragon dictation. If there are questions or for further clarification, please contact me.   The following portions of the patient's history were reviewed and updated as appropriate: allergies, current medications, past family history, past medical history, past social history, past surgical history and problem list.    Review of Systems   Constitutional: Negative for fatigue and fever.   HENT: Positive for congestion. Negative for trouble swallowing.    Eyes: Negative for discharge and visual disturbance.   Respiratory: Negative for choking and shortness of breath.    Cardiovascular: Negative for chest pain and palpitations.   Gastrointestinal: Negative for abdominal pain and blood in stool.   Endocrine: Negative.    Genitourinary: Positive for frequency. Negative for genital sores and hematuria.   Musculoskeletal: Negative for gait problem and joint swelling.   Skin: Negative for color change, pallor, rash and wound.   Allergic/Immunologic: Positive for environmental allergies. Negative for immunocompromised state.   Neurological: Negative for facial asymmetry and speech difficulty.   Psychiatric/Behavioral: Negative for hallucinations and suicidal ideas.       Objective   Physical Exam   Constitutional: He is oriented to person, place, and time. He appears well-developed and well-nourished.   HENT:   Head: Normocephalic.   Eyes: Conjunctivae are normal. Pupils are equal, round, and reactive to light.   Neck: Normal range of motion. Neck supple.   Cardiovascular: Normal rate, regular  rhythm and normal heart sounds.   Pulmonary/Chest: Effort normal and breath sounds normal.   Abdominal: Soft. Bowel sounds are normal.   Musculoskeletal: Normal range of motion.   Neurological: He is alert and oriented to person, place, and time.   Skin: Skin is warm and dry.   Psychiatric: He has a normal mood and affect. His behavior is normal. Judgment and thought content normal.   Nursing note and vitals reviewed.      Assessment/Plan   Problems Addressed this Visit        Cardiovascular and Mediastinum    Hypertension - Primary    Hyperlipidemia    Relevant Medications    rosuvastatin (CRESTOR) 20 MG tablet       Endocrine    Type 2 diabetes mellitus with diabetic arthropathy (CMS/[facility])      Other Visit Diagnoses     Urinary tract infection without hematuria, site unspecified        Relevant Orders    Urinalysis With Microscopic - Urine, Clean Catch (Completed)    Urinalysis without microscopic (no culture) - Urine, Clean Catch (Completed)    Urinalysis, Microscopic Only - Urine, Clean Catch (Completed)

## 2019-01-22 ENCOUNTER — CLINICAL SUPPORT (OUTPATIENT)
Dept: FAMILY MEDICINE CLINIC | Facility: CLINIC | Age: 69
End: 2019-01-22

## 2019-01-22 VITALS — SYSTOLIC BLOOD PRESSURE: 126 MMHG | DIASTOLIC BLOOD PRESSURE: 52 MMHG

## 2019-01-23 RX ORDER — GLIMEPIRIDE 4 MG/1
TABLET ORAL
Qty: 180 TABLET | Refills: 2 | Status: SHIPPED | OUTPATIENT
Start: 2019-01-23 | End: 2019-04-11

## 2019-01-24 ENCOUNTER — CLINICAL SUPPORT (OUTPATIENT)
Dept: FAMILY MEDICINE CLINIC | Facility: CLINIC | Age: 69
End: 2019-01-24

## 2019-01-24 VITALS — SYSTOLIC BLOOD PRESSURE: 142 MMHG | DIASTOLIC BLOOD PRESSURE: 62 MMHG

## 2019-01-24 DIAGNOSIS — Z01.30 BLOOD PRESSURE CHECK: Primary | ICD-10-CM

## 2019-01-29 ENCOUNTER — TELEPHONE (OUTPATIENT)
Dept: FAMILY MEDICINE CLINIC | Facility: CLINIC | Age: 69
End: 2019-01-29

## 2019-01-29 NOTE — TELEPHONE ENCOUNTER
----- Message from Tre De Leon MD sent at 1/29/2019  9:37 AM EST -----  Not sure what else to use would try over-the-counter Benadryl  ----- Message -----  From: Sondra Rehman MA  Sent: 1/24/2019   1:53 PM  To: Tre De Leon MD    States sleeping pill not working takes 2 still doesn't work

## 2019-02-07 ENCOUNTER — OFFICE VISIT (OUTPATIENT)
Dept: FAMILY MEDICINE CLINIC | Facility: CLINIC | Age: 69
End: 2019-02-07

## 2019-02-07 VITALS
DIASTOLIC BLOOD PRESSURE: 64 MMHG | HEART RATE: 68 BPM | BODY MASS INDEX: 36.57 KG/M2 | OXYGEN SATURATION: 97 % | TEMPERATURE: 98.6 F | HEIGHT: 67 IN | WEIGHT: 233 LBS | SYSTOLIC BLOOD PRESSURE: 138 MMHG

## 2019-02-07 DIAGNOSIS — E11.610 TYPE 2 DIABETES MELLITUS WITH DIABETIC NEUROPATHIC ARTHROPATHY, WITHOUT LONG-TERM CURRENT USE OF INSULIN (HCC): ICD-10-CM

## 2019-02-07 DIAGNOSIS — E78.2 MIXED HYPERLIPIDEMIA: ICD-10-CM

## 2019-02-07 DIAGNOSIS — I10 ESSENTIAL HYPERTENSION: Primary | ICD-10-CM

## 2019-02-07 PROCEDURE — 99214 OFFICE O/P EST MOD 30 MIN: CPT | Performed by: INTERNAL MEDICINE

## 2019-02-07 RX ORDER — CLONIDINE HYDROCHLORIDE 0.1 MG/1
0.1 TABLET ORAL 2 TIMES DAILY
Qty: 60 TABLET | Refills: 3 | Status: SHIPPED | OUTPATIENT
Start: 2019-02-07 | End: 2019-02-08 | Stop reason: SDUPTHER

## 2019-02-07 RX ORDER — MIRTAZAPINE 45 MG/1
45 TABLET, FILM COATED ORAL NIGHTLY
Qty: 30 TABLET | Refills: 3 | Status: SHIPPED | OUTPATIENT
Start: 2019-02-07 | End: 2019-05-09 | Stop reason: DRUGHIGH

## 2019-02-07 NOTE — PROGRESS NOTES
Subjective   James Solis is a 68 y.o. male.     History of Present Illness   Patient was seen for hypertension.  Blood pressure was averaging in the 150s over 80s.  Patient had clonidine ordered and will recheck in the office in 1 month.  His blood sugars were normal in the 130s.  Lipids a been controlled with his diet and statin.    Dictated utilizing Dragon dictation. If there are questions or for further clarification, please contact me.   The following portions of the patient's history were reviewed and updated as appropriate: allergies, current medications, past family history, past medical history, past social history, past surgical history and problem list.    Review of Systems   Constitutional: Negative for fatigue and fever.   HENT: Positive for congestion. Negative for trouble swallowing.    Eyes: Negative for discharge and visual disturbance.   Respiratory: Negative for choking and shortness of breath.    Cardiovascular: Negative for chest pain and palpitations.   Gastrointestinal: Negative for abdominal pain and blood in stool.   Endocrine: Negative.    Genitourinary: Negative for genital sores and hematuria.   Musculoskeletal: Negative for gait problem and joint swelling.   Skin: Negative for color change, pallor, rash and wound.   Allergic/Immunologic: Positive for environmental allergies. Negative for immunocompromised state.   Neurological: Negative for facial asymmetry and speech difficulty.   Psychiatric/Behavioral: Negative for hallucinations and suicidal ideas.       Objective   Physical Exam   Constitutional: He is oriented to person, place, and time. He appears well-developed and well-nourished.   HENT:   Head: Normocephalic.   Eyes: Conjunctivae are normal. Pupils are equal, round, and reactive to light.   Neck: Normal range of motion. Neck supple.   Cardiovascular: Normal rate, regular rhythm and normal heart sounds.   Pulmonary/Chest: Effort normal and breath sounds normal.   Abdominal:  Soft. Bowel sounds are normal.   Musculoskeletal: Normal range of motion.   Neurological: He is alert and oriented to person, place, and time.   Skin: Skin is warm and dry.   Psychiatric: He has a normal mood and affect. His behavior is normal. Judgment and thought content normal.   Nursing note and vitals reviewed.      Assessment/Plan   Problems Addressed this Visit        Cardiovascular and Mediastinum    Hypertension - Primary    Relevant Medications    CloNIDine (CATAPRES) 0.1 MG tablet    Hyperlipidemia       Endocrine    Type 2 diabetes mellitus with diabetic arthropathy (CMS/[facility])

## 2019-02-08 ENCOUNTER — TELEPHONE (OUTPATIENT)
Dept: FAMILY MEDICINE CLINIC | Facility: CLINIC | Age: 69
End: 2019-02-08

## 2019-02-08 RX ORDER — CLONIDINE HYDROCHLORIDE 0.1 MG/1
0.1 TABLET ORAL 2 TIMES DAILY
Qty: 60 TABLET | Refills: 3 | Status: SHIPPED | OUTPATIENT
Start: 2019-02-08 | End: 2019-03-07 | Stop reason: DRUGHIGH

## 2019-02-08 NOTE — TELEPHONE ENCOUNTER
CAN YOU DC THE CLONIDINE THAT WAS SENT TO [facility] AND SEND IT TO [facility] PLEASE

## 2019-02-19 RX ORDER — LABETALOL 200 MG/1
TABLET, FILM COATED ORAL
Qty: 60 TABLET | Refills: 3 | Status: SHIPPED | OUTPATIENT
Start: 2019-02-19 | End: 2019-06-11 | Stop reason: SDUPTHER

## 2019-03-04 RX ORDER — AMLODIPINE BESYLATE 10 MG/1
TABLET ORAL
Qty: 90 TABLET | Refills: 2 | Status: SHIPPED | OUTPATIENT
Start: 2019-03-04 | End: 2019-07-08 | Stop reason: SDUPTHER

## 2019-03-04 RX ORDER — ROSUVASTATIN CALCIUM 20 MG/1
TABLET, COATED ORAL
Qty: 90 TABLET | Refills: 0 | Status: SHIPPED | OUTPATIENT
Start: 2019-03-04 | End: 2020-01-02 | Stop reason: SINTOL

## 2019-03-07 ENCOUNTER — OFFICE VISIT (OUTPATIENT)
Dept: FAMILY MEDICINE CLINIC | Facility: CLINIC | Age: 69
End: 2019-03-07

## 2019-03-07 VITALS
HEART RATE: 79 BPM | BODY MASS INDEX: 37.51 KG/M2 | HEIGHT: 67 IN | OXYGEN SATURATION: 99 % | TEMPERATURE: 98 F | SYSTOLIC BLOOD PRESSURE: 160 MMHG | RESPIRATION RATE: 15 BRPM | DIASTOLIC BLOOD PRESSURE: 70 MMHG | WEIGHT: 239 LBS

## 2019-03-07 DIAGNOSIS — E78.2 MIXED HYPERLIPIDEMIA: ICD-10-CM

## 2019-03-07 DIAGNOSIS — I10 ESSENTIAL HYPERTENSION: Primary | ICD-10-CM

## 2019-03-07 DIAGNOSIS — J44.9 CHRONIC OBSTRUCTIVE PULMONARY DISEASE, UNSPECIFIED COPD TYPE (HCC): ICD-10-CM

## 2019-03-07 PROCEDURE — 99214 OFFICE O/P EST MOD 30 MIN: CPT | Performed by: INTERNAL MEDICINE

## 2019-03-07 RX ORDER — ASPIRIN 81 MG/1
81 TABLET ORAL DAILY
COMMUNITY

## 2019-03-07 RX ORDER — MIRTAZAPINE 15 MG/1
15 TABLET, FILM COATED ORAL NIGHTLY
COMMUNITY
End: 2019-04-11

## 2019-03-07 RX ORDER — CLONIDINE HYDROCHLORIDE 0.1 MG/1
TABLET ORAL
Qty: 90 TABLET | Refills: 3 | Status: SHIPPED | OUTPATIENT
Start: 2019-03-07 | End: 2019-04-11

## 2019-03-08 NOTE — PROGRESS NOTES
Subjective   James Solis is a 68 y.o. male.     History of Present Illness   Patient was seen for hypertension.  Pressures been running 130s over 80s at home.  Lipids controlled with diet exercise and medication.  COPD also controlled with his bronchodilators.    Dictated utilizing Dragon dictation. If there are questions or for further clarification, please contact me.  The following portions of the patient's history were reviewed and updated as appropriate: allergies, current medications, past family history, past medical history, past social history, past surgical history and problem list.    Review of Systems   Constitutional: Negative for fatigue and fever.   HENT: Positive for congestion. Negative for trouble swallowing.    Eyes: Negative for discharge and visual disturbance.   Respiratory: Negative for choking and shortness of breath.    Cardiovascular: Negative for chest pain and palpitations.   Gastrointestinal: Negative for abdominal pain and blood in stool.   Endocrine: Negative.    Genitourinary: Negative for genital sores and hematuria.   Musculoskeletal: Negative for gait problem and joint swelling.   Skin: Negative for color change, pallor, rash and wound.   Allergic/Immunologic: Positive for environmental allergies. Negative for immunocompromised state.   Neurological: Negative for facial asymmetry and speech difficulty.   Psychiatric/Behavioral: Negative for hallucinations and suicidal ideas.       Objective   Physical Exam   Constitutional: He is oriented to person, place, and time. He appears well-developed and well-nourished.   HENT:   Head: Normocephalic.   Eyes: Conjunctivae are normal. Pupils are equal, round, and reactive to light.   Neck: Normal range of motion. Neck supple.   Cardiovascular: Normal rate, regular rhythm and normal heart sounds.   Pulmonary/Chest: Effort normal and breath sounds normal.   Abdominal: Soft. Bowel sounds are normal.   Musculoskeletal: Normal range of motion.    Neurological: He is alert and oriented to person, place, and time.   Skin: Skin is warm and dry.   Psychiatric: He has a normal mood and affect. His behavior is normal. Judgment and thought content normal.   Nursing note and vitals reviewed.      Assessment/Plan   Problems Addressed this Visit        Cardiovascular and Mediastinum    Hypertension - Primary    Relevant Medications    CloNIDine (CATAPRES) 0.1 MG tablet    Hyperlipidemia       Respiratory    COPD (chronic obstructive pulmonary disease) (CMS/[facility])

## 2019-03-28 RX ORDER — OMEPRAZOLE 40 MG/1
CAPSULE, DELAYED RELEASE ORAL
Qty: 90 CAPSULE | Refills: 2 | Status: SHIPPED | OUTPATIENT
Start: 2019-03-28

## 2019-03-28 RX ORDER — LISINOPRIL 40 MG/1
TABLET ORAL
Qty: 90 TABLET | Refills: 2 | Status: SHIPPED | OUTPATIENT
Start: 2019-03-28 | End: 2019-07-15 | Stop reason: SDUPTHER

## 2019-04-11 ENCOUNTER — OFFICE VISIT (OUTPATIENT)
Dept: FAMILY MEDICINE CLINIC | Facility: CLINIC | Age: 69
End: 2019-04-11

## 2019-04-11 VITALS
WEIGHT: 237 LBS | HEIGHT: 67 IN | HEART RATE: 84 BPM | TEMPERATURE: 98.4 F | DIASTOLIC BLOOD PRESSURE: 60 MMHG | BODY MASS INDEX: 37.2 KG/M2 | OXYGEN SATURATION: 98 % | SYSTOLIC BLOOD PRESSURE: 122 MMHG | RESPIRATION RATE: 20 BRPM

## 2019-04-11 DIAGNOSIS — I10 ESSENTIAL HYPERTENSION: ICD-10-CM

## 2019-04-11 DIAGNOSIS — E78.00 PURE HYPERCHOLESTEROLEMIA: ICD-10-CM

## 2019-04-11 DIAGNOSIS — Z00.00 MEDICARE ANNUAL WELLNESS VISIT, INITIAL: Primary | ICD-10-CM

## 2019-04-11 DIAGNOSIS — E11.610 TYPE 2 DIABETES MELLITUS WITH DIABETIC NEUROPATHIC ARTHROPATHY, WITHOUT LONG-TERM CURRENT USE OF INSULIN (HCC): ICD-10-CM

## 2019-04-11 PROCEDURE — 99214 OFFICE O/P EST MOD 30 MIN: CPT | Performed by: INTERNAL MEDICINE

## 2019-04-11 PROCEDURE — G0438 PPPS, INITIAL VISIT: HCPCS | Performed by: INTERNAL MEDICINE

## 2019-04-11 RX ORDER — CLONIDINE HYDROCHLORIDE 0.1 MG/1
TABLET ORAL
Qty: 60 TABLET | Refills: 3 | Status: SHIPPED
Start: 2019-04-11 | End: 2020-04-08

## 2019-04-11 NOTE — PATIENT INSTRUCTIONS
Medicare Wellness  Personal Prevention Plan of Service     Date of Office Visit:  2019  Encounter Provider:  Tre De Leon MD  Place of Service:  [facility]  Patient Name: James Solis  :  1950    As part of the Medicare Wellness portion of your visit today, we are providing you with this personalized preventive plan of services (PPPS). This plan is based upon recommendations of the [place] Preventive Services Task Force (USPSTF) and the Advisory Committee on Immunization Practices (ACIP).    This lists the preventive care services that should be considered, and provides dates of when you are due. Items listed as completed are up-to-date and do not require any further intervention.    Health Maintenance   Topic Date Due   • ZOSTER VACCINE (2 of 2) 2017   • DIABETIC FOOT EXAM  2017   • URINE MICROALBUMIN  2018   • HEMOGLOBIN A1C  2019   • INFLUENZA VACCINE  2019   • PROSTATE CANCER SCREENING  2019   • LIPID PANEL  2019   • DIABETIC EYE EXAM  2019   • MEDICARE ANNUAL WELLNESS  2020   • COLONOSCOPY  2023   • TDAP/TD VACCINES (3 - Td) 2027   • HEPATITIS C SCREENING  Addressed   • PNEUMOCOCCAL VACCINES (65+ LOW/MEDIUM RISK)  Addressed   • AAA SCREEN (ONE-TIME)  Addressed       No orders of the defined types were placed in this encounter.      No Follow-up on file.

## 2019-04-11 NOTE — PROGRESS NOTES
Initial Medicare Wellness Visit   The ABC's of the Annual Wellness Visit    Chief Complaint   Patient presents with   • Hypertension   • Diabetes     bs dropped to 25 yesterday       HPI:  QUINTON Nichols-1950, is a 69 y.o. male who presents for an Initial Medicare Wellness Visit.    Recent Hospitalizations:  Recently treated at the following:  Other: [facility]    Current Medical Providers:  Patient Care Team:  Tre De Leon MD as PCP - General  Tre De Leon MD as PCP - Claims Attributed    Health Habits and Functional and Cognitive Screening and Depression Screening:  Functional & Cognitive Status 2019   Do you have difficulty preparing food and eating? No   Do you have difficulty bathing yourself, getting dressed or grooming yourself? No   Do you have difficulty using the toilet? No   Do you have difficulty moving around from place to place? No   Do you have trouble with steps or getting out of a bed or a chair? No   In the past year have you fallen or experienced a near fall? No   Current Diet Well Balanced Diet   Dental Exam Not up to date   Eye Exam Up to date   Exercise (times per week) 3 times per week   Current Exercise Activities Include Aerobics   Do you need help using the phone?  No   Are you deaf or do you have serious difficulty hearing?  No   Do you need help with transportation? No   Do you need help shopping? No   Do you need help preparing meals?  No   Do you need help with housework?  No   Do you need help with laundry? No   Do you need help taking your medications? No   Do you need help managing money? No   Do you ever drive or ride in a car without wearing a seat belt? No   Have you felt unusual stress, anger or loneliness in the last month? No   Who do you live with? Alone   If you need help, do you have trouble finding someone available to you? No   Have you been bothered in the last four weeks by sexual problems? No   Do you have difficulty concentrating, remembering or  making decisions? No       Compared to one year ago, the patient feels his physical health is the same and his mental health is the same.    Depression Screen:  PHQ-2/PHQ-9 Depression Screening 4/11/2019   Little interest or pleasure in doing things -   Feeling down, depressed, or hopeless -   Trouble falling or staying asleep, or sleeping too much 0   Feeling tired or having little energy 0   Poor appetite or overeating 0   Feeling bad about yourself - or that you are a failure or have let yourself or your family down 0   Trouble concentrating on things, such as reading the newspaper or watching television 0   Moving or speaking so slowly that other people could have noticed. Or the opposite - being so fidgety or restless that you have been moving around a lot more than usual 0   Thoughts that you would be better off dead, or of hurting yourself in some way 0   Total Score 0   If you checked off any problems, how difficult have these problems made it for you to do your work, take care of things at home, or get along with other people? Not difficult at all         Past Medical/Family/Social History:  The following portions of the patient's history were reviewed and updated as appropriate: allergies, current medications, past family history, past medical history, past social history, past surgical history and problem list.    Allergies   Allergen Reactions   • Celebrex [Celecoxib]    • Crestor [Rosuvastatin]    • Lipitor [Atorvastatin]    • Zetia [Ezetimibe]          Current Outpatient Medications:   •  amLODIPine (NORVASC) 10 MG tablet, TAKE ONE TABLET BY MOUTH DAILY, Disp: 90 tablet, Rfl: 2  •  aspirin 81 MG EC tablet, Take 81 mg by mouth Daily., Disp: , Rfl:   •  Calcium Citrate-Vitamin D (CALCIUM CITRATE + PO), Take  by mouth 2 (Two) Times a Day., Disp: , Rfl:   •  CloNIDine (CATAPRES) 0.1 MG tablet, 1 po bid, Disp: 60 tablet, Rfl: 3  •  Dapagliflozin-Metformin HCl ER 5-500 MG tablet sustained-release 24 hour,  Take 500 mg by mouth Daily., Disp: 30 tablet, Rfl: 4  •  ipratropium-albuterol (DUO-NEB) 0.5-2.5 mg/mL nebulizer, Take 3 mL by nebulization every 4 (four) hours as needed for wheezing., Disp: 360 mL, Rfl: 3  •  lisinopril (PRINIVIL,ZESTRIL) 40 MG tablet, TAKE ONE TABLET BY MOUTH DAILY, Disp: 90 tablet, Rfl: 2  •  mirtazapine (REMERON) 45 MG tablet, Take 1 tablet by mouth Every Night., Disp: 30 tablet, Rfl: 3  •  omeprazole (priLOSEC) 40 MG capsule, TAKE ONE CAPSULE BY MOUTH DAILY, Disp: 90 capsule, Rfl: 2  •  rosuvastatin (CRESTOR) 20 MG tablet, TAKE ONE TABLET BY MOUTH DAILY, Disp: 90 tablet, Rfl: 0  •  sulfacetamide (BLEPH-10) 10 % ophthalmic solution, Administer 1 drop into the left eye 4 (Four) Times a Day., Disp: 15 mL, Rfl: 1  •  vitamin D (ERGOCALCIFEROL) 25508 units capsule capsule, 1 by mouth twice a week, Disp: 24 capsule, Rfl: 3  •  albuterol (PROVENTIL HFA;VENTOLIN HFA) 108 (90 Base) MCG/ACT inhaler, Inhale 2 puffs Every 4 (Four) Hours As Needed for Wheezing., Disp: 1 inhaler, Rfl: 3  •  citalopram (CeleXA) 40 MG tablet, TAKE ONE TABLET BY MOUTH ONCE DAILY, Disp: 30 tablet, Rfl: 3  •  diclofenac-misoprostol (ARTHROTEC 75) 75-0.2 MG EC tablet, Take 1 tablet by mouth 2 (Two) Times a Day., Disp: , Rfl:   •  fluticasone (FLONASE) 50 MCG/ACT nasal spray, 2 sprays into each nostril Daily., Disp: , Rfl:   •  ibuprofen (ADVIL,MOTRIN) 800 MG tablet, Take 1 tablet by mouth 3 (Three) Times a Day As Needed for Mild Pain ., Disp: 90 tablet, Rfl: 1  •  labetalol (NORMODYNE) 200 MG tablet, TAKE ONE TABLET BY MOUTH TWICE A DAY, Disp: 60 tablet, Rfl: 3  •  levalbuterol (XOPENEX HFA) 45 MCG/ACT inhaler, Inhale 1-2 puffs Every 4 (Four) Hours As Needed for Wheezing., Disp: , Rfl:   •  mometasone (NASONEX) 50 MCG/ACT nasal spray, 2 sprays into each nostril daily., Disp: , Rfl:   •  Omeprazole-Sodium Bicarbonate (ZEGERID PO), Take  by mouth., Disp: , Rfl:     Aspirin use counseling: Taking ASA appropriately as  "indicated    Current medication list contains no high risk medications.  No harmful drug interactions have been identified.     Family History   Problem Relation Age of Onset   • Heart attack Mother    • Heart attack Father    • Alcohol abuse Other    • Asthma Other    • Diabetes Other    • Hypertension Other        Social History     Tobacco Use   • Smoking status: Former Smoker   • Smokeless tobacco: Never Used   Substance Use Topics   • Alcohol use: Yes     Comment: occasional       Past Surgical History:   Procedure Laterality Date   • MOUTH SURGERY         Patient Active Problem List   Diagnosis   • Hypertension   • Hyperlipidemia   • Dupuytren's contracture   • DJD (degenerative joint disease)   • COPD (chronic obstructive pulmonary disease) (CMS/[facility])   • Primary osteoarthritis involving multiple joints   • Prostate cancer screening   • Open fracture of two ribs on right side with routine healing   • Type 2 diabetes mellitus with diabetic arthropathy (CMS/[facility])   • Avascular necrosis of bones of both hips (CMS/[facility])       Review of Systems    Objective     Vitals:    04/11/19 1424   BP: 122/60   BP Location: Left arm   Patient Position: Sitting   Pulse: 84   Resp: 20   Temp: 98.4 °F (36.9 °C)   TempSrc: Oral   SpO2: 98%   Weight: 108 kg (237 lb)   Height: 170.2 cm (67\")   PainSc: 0-No pain       Patient's Body mass index is 37.12 kg/m². BMI is above normal parameters. Recommendations include: educational material and exercise counseling.      No exam data present    NA    Physical Exam    Recent Lab Results:     Lab Results   Component Value Date    CHOL 171 12/20/2018    TRIG 172 (H) 12/20/2018    HDL 47 12/20/2018    VLDL 34.4 12/20/2018    LDLHDL 1.91 12/20/2018         Assessment/Plan   Age-appropriate Screening Schedule:  Refer to the list below for future screening recommendations based on patient's age, sex and/or medical conditions.      Health Maintenance   Topic Date Due   • ZOSTER VACCINE (2 of 2) " 08/08/2017   • DIABETIC FOOT EXAM  12/01/2017   • URINE MICROALBUMIN  09/25/2018   • HEMOGLOBIN A1C  06/20/2019   • INFLUENZA VACCINE  08/01/2019   • PROSTATE CANCER SCREENING  08/08/2019   • LIPID PANEL  12/20/2019   • DIABETIC EYE EXAM  12/20/2019   • COLONOSCOPY  01/01/2023   • TDAP/TD VACCINES (3 - Td) 06/13/2027   • PNEUMOCOCCAL VACCINES (65+ LOW/MEDIUM RISK)  Addressed       Medicare Risks and Personalized Health Plan:  NA      CMS-Preventive Services Quick Reference  Medicare Preventive Services Addressed:  NA    Advance Care Planning:  Patient has an advance directive - a copy has been provided and is visible in patient header    Diagnoses and all orders for this visit:    1. Type 2 diabetes mellitus with diabetic neuropathic arthropathy, without long-term current use of insulin (CMS/[facility]) (Primary)    2. Pure hypercholesterolemia    3. Essential hypertension    Other orders  -     CloNIDine (CATAPRES) 0.1 MG tablet; 1 po bid  Dispense: 60 tablet; Refill: 3        An After Visit Summary and PPPS with all of these plans were given to the patient.      Follow Up:  No Follow-up on file.

## 2019-04-11 NOTE — PROGRESS NOTES
Subjective   James Solis is a 69 y.o. male.     History of Present Illness   Patient was seen for Medicare wellness exam.  Blood sugars been running in the 110s.  Patient had a severe drop in his blood sugar fell and hit his right arm.  Patient does not want x-ray at this time.  Patient was taken off Amaryl and left on his SGL T-2 and metformin.  Patient blood pressure is also in the 110s over 80s and his clonidine was reduced to 0.1 mg twice daily.  Patient will call in 2 weeks and if blood pressure is okay we will continue to wean clonidine.  Patient is on a diet and exercising.  His lipids have been controlled with his statin.    Dictated utilizing Dragon dictation. If there are questions or for further clarification, please contact me.  The following portions of the patient's history were reviewed and updated as appropriate: allergies, current medications, past family history, past medical history, past social history, past surgical history and problem list.    Review of Systems   Constitutional: Negative for fatigue and fever.   HENT: Positive for congestion. Negative for trouble swallowing.    Eyes: Negative for discharge and visual disturbance.   Respiratory: Negative for choking and shortness of breath.    Cardiovascular: Negative for chest pain and palpitations.   Gastrointestinal: Negative for abdominal pain and blood in stool.   Endocrine: Negative.    Genitourinary: Negative for genital sores and hematuria.   Musculoskeletal: Negative for gait problem and joint swelling.   Skin: Negative for color change, pallor, rash and wound.   Allergic/Immunologic: Positive for environmental allergies. Negative for immunocompromised state.   Neurological: Negative for facial asymmetry and speech difficulty.   Psychiatric/Behavioral: Negative for hallucinations and suicidal ideas.       Objective   Physical Exam   Constitutional: He is oriented to person, place, and time. He appears well-developed and well-nourished.    HENT:   Head: Normocephalic.   Eyes: Conjunctivae are normal. Pupils are equal, round, and reactive to light.   Neck: Normal range of motion. Neck supple.   Cardiovascular: Normal rate, regular rhythm and normal heart sounds.   Pulmonary/Chest: Effort normal and breath sounds normal.   Abdominal: Soft. Bowel sounds are normal.   Musculoskeletal: Normal range of motion.   Neurological: He is alert and oriented to person, place, and time.   Skin: Skin is warm and dry.   Psychiatric: He has a normal mood and affect. His behavior is normal. Judgment and thought content normal.   Nursing note and vitals reviewed.      Assessment/Plan   Problems Addressed this Visit        Cardiovascular and Mediastinum    Hypertension    Relevant Medications    CloNIDine (CATAPRES) 0.1 MG tablet    Hyperlipidemia       Endocrine    Type 2 diabetes mellitus with diabetic arthropathy (CMS/[facility])      Other Visit Diagnoses     Medicare annual wellness visit, initial    -  Primary

## 2019-04-16 ENCOUNTER — OFFICE VISIT (OUTPATIENT)
Dept: FAMILY MEDICINE CLINIC | Facility: CLINIC | Age: 69
End: 2019-04-16

## 2019-04-16 ENCOUNTER — TELEPHONE (OUTPATIENT)
Dept: FAMILY MEDICINE CLINIC | Facility: CLINIC | Age: 69
End: 2019-04-16

## 2019-04-16 VITALS
BODY MASS INDEX: 36.6 KG/M2 | HEART RATE: 64 BPM | HEIGHT: 67 IN | WEIGHT: 233.2 LBS | SYSTOLIC BLOOD PRESSURE: 150 MMHG | DIASTOLIC BLOOD PRESSURE: 90 MMHG | TEMPERATURE: 98 F | OXYGEN SATURATION: 95 %

## 2019-04-16 DIAGNOSIS — M53.3 COCCYGEAL PAIN, ACUTE: ICD-10-CM

## 2019-04-16 DIAGNOSIS — S20.212A CONTUSION OF RIBS, LEFT, INITIAL ENCOUNTER: Primary | ICD-10-CM

## 2019-04-16 PROCEDURE — 72220 X-RAY EXAM SACRUM TAILBONE: CPT | Performed by: INTERNAL MEDICINE

## 2019-04-16 PROCEDURE — 71101 X-RAY EXAM UNILAT RIBS/CHEST: CPT | Performed by: INTERNAL MEDICINE

## 2019-04-16 PROCEDURE — 99214 OFFICE O/P EST MOD 30 MIN: CPT | Performed by: INTERNAL MEDICINE

## 2019-04-16 RX ORDER — OXYCODONE HYDROCHLORIDE 5 MG/1
5 TABLET ORAL EVERY 8 HOURS PRN
Qty: 10 TABLET | Refills: 0 | Status: SHIPPED | OUTPATIENT
Start: 2019-04-16 | End: 2019-05-09

## 2019-04-16 RX ORDER — HYDROCODONE BITARTRATE AND ACETAMINOPHEN 10; 325 MG/1; MG/1
1 TABLET ORAL EVERY 8 HOURS PRN
Qty: 20 TABLET | Refills: 0 | Status: SHIPPED | OUTPATIENT
Start: 2019-04-16 | End: 2019-05-09

## 2019-04-16 NOTE — TELEPHONE ENCOUNTER
Patient informed re: contract take oxycodone as needed. Also called pharmacy and cancelled hydrocodone.

## 2019-04-16 NOTE — PROGRESS NOTES
Subjective   James Solis is a 69 y.o. male.   Recent fall on 4/11/2019 was here for the purposes declined x-rays at that time  History of Present Illness   Still with pain initially marginal tailbone and also in the left ribs as well.  One fall.  No reported head injury with this.  Mid back and lower back are fine coccyx and left posterior ribs are very sore.  No hemoptysis abdominal pain etc.  Family history positive for heart attack alcohol abuse asthma and diabetes and hypertension.  Former smoker.  Drug allergies are Celebrex Crestor Lipitor and Zetia  Review of Systems   Musculoskeletal: Positive for back pain.   All other systems reviewed and are negative.      Objective   Vitals:    04/16/19 0807   BP: 150/90   Pulse: 64   Temp: 98 °F (36.7 °C)   SpO2: 95%   Weight: 106 kg (233 lb 3.2 oz)     Physical Exam   Constitutional: He appears well-developed and well-nourished.   HENT:   Head: Normocephalic and atraumatic.   Eyes: Conjunctivae are normal. Pupils are equal, round, and reactive to light.   Cardiovascular: Normal rate, regular rhythm and normal heart sounds.   Pulmonary/Chest: Effort normal and breath sounds normal.   Abdominal: Soft. Bowel sounds are normal.   Musculoskeletal:   Mild discomfort with palpation distal coccyx..  Nontender over the T or LS-spine.  Pain palpation left lateral ribs posterior axillary line  T8 and T9.   Neurological: He is alert.   Relatively stable gait and station   Skin: Skin is warm and dry.       No results found for: INR    Procedures  Left rib detail x-rays obtained.  No comparison available.  No acute bony or soft tissue normalities are noted..  Fairly benign age-appropriate x-ray.    Sacrum and coccyx x-rays PA and lateral views obtained.  No comparison available.  No bony or soft tissue normalities are noted.  Assessment/Plan   1.  Contusion left ribs status post recent fall in setting of low blood sugar.  Observation for now follow-up if not well in 1 month's time  expect slow gradual recovery    2.  Coccygeal pain status post fall patient was advised just to wait 2 months if pain persist beyond 2 months follow-up of this also can typically be a slow recovery.    Much of this encounter note is an electronic transcription/translation of spoken language to printed text.  The electronic translation of spoken language may permit erroneous, or at times, nonsensical words or phrases to be inadvertently transcribed.  Although I have reviewed the note for such errors, some may still exist. If there are questions or for further clarification, please contact me.

## 2019-04-16 NOTE — TELEPHONE ENCOUNTER
PATIENT CAME IN AND SAW [facility] BUT HE WANTS YOU TO FILL HIS OXYCODONE 7.5/325 TWICE DAILY. I CALLED [facility] TO MAKE SURE NOT GIVING , PATIENT STATES [facility]TH[facility] BASICLY DISMISSED AND DOESN'T KNOW HE FELL.

## 2019-04-16 NOTE — TELEPHONE ENCOUNTER
He was seen today by [facility] cause he couldn't get in with you ......he cant take hydrocodone it makes him itch. OXYcodone doesn't make him itch

## 2019-05-09 ENCOUNTER — OFFICE VISIT (OUTPATIENT)
Dept: FAMILY MEDICINE CLINIC | Facility: CLINIC | Age: 69
End: 2019-05-09

## 2019-05-09 VITALS
HEIGHT: 67 IN | SYSTOLIC BLOOD PRESSURE: 118 MMHG | WEIGHT: 230.6 LBS | BODY MASS INDEX: 36.19 KG/M2 | DIASTOLIC BLOOD PRESSURE: 68 MMHG | HEART RATE: 68 BPM | OXYGEN SATURATION: 100 % | TEMPERATURE: 97.7 F

## 2019-05-09 DIAGNOSIS — E55.9 VITAMIN D DEFICIENCY: ICD-10-CM

## 2019-05-09 DIAGNOSIS — E11.610 TYPE 2 DIABETES MELLITUS WITH DIABETIC NEUROPATHIC ARTHROPATHY, WITHOUT LONG-TERM CURRENT USE OF INSULIN (HCC): ICD-10-CM

## 2019-05-09 DIAGNOSIS — E78.2 MIXED HYPERLIPIDEMIA: ICD-10-CM

## 2019-05-09 DIAGNOSIS — I10 ESSENTIAL HYPERTENSION: Primary | ICD-10-CM

## 2019-05-09 PROCEDURE — 99214 OFFICE O/P EST MOD 30 MIN: CPT | Performed by: INTERNAL MEDICINE

## 2019-05-09 RX ORDER — GLIMEPIRIDE 4 MG/1
4 TABLET ORAL DAILY
COMMUNITY
End: 2020-05-18 | Stop reason: SDUPTHER

## 2019-05-09 RX ORDER — MIRTAZAPINE 15 MG/1
15 TABLET, FILM COATED ORAL NIGHTLY
Qty: 30 TABLET | Refills: 3
Start: 2019-05-09

## 2019-05-09 RX ORDER — ONDANSETRON 4 MG/1
4 TABLET, FILM COATED ORAL EVERY 8 HOURS PRN
COMMUNITY
End: 2020-01-02

## 2019-05-09 NOTE — PROGRESS NOTES
[address] [place] [address] [address] [id number] 
[phone] Patient: Conchita Sparks MRN: [id number] EOL:9/3/1883 Visit Information Date & Time Provider Department Dept. Phone Encounter #  
 9/7/2018 10:45 AM [id number] AMY Cervantes, [facility] [phone] Follow-up Instructions Return in about 7 weeks (around 10/26/2018). Upcoming Health Maintenance Date Due  
 FOOT EXAM Q1 4/22/2017 EYE EXAM RETINAL OR DILATED Q1 5/19/2017 Influenza Age 5 to Adult 8/1/2018 DTaP/Tdap/Td series (2 - Td) 10/16/2018 HEMOGLOBIN A1C Q6M 1/2/2019 MICROALBUMIN Q1 2/19/2019 LIPID PANEL Q1 2/19/2019 PAP AKA CERVICAL CYTOLOGY 4/26/2019 MEDICARE YEARLY EXAM 5/12/2019 FOBT Q 1 YEAR AGE 50-75 5/13/2019 BREAST CANCER SCRN MAMMOGRAM 6/4/2020 Allergies as of 9/7/2018  Review Complete On: 9/7/2018 By: Bartolo Mann LPN Severity Noted Reaction Type Reactions Lipitor [Atorvastatin]    Other (comments)  
 myalgias Lisinopril  07/02/2014    Cough Aspirin Low 11/07/2012   Intolerance Other (comments) Nose bleeds Zocor [Simvastatin] Low 11/07/2012   Side Effect Other (comments) Causes pain in the left leg Current Immunizations  Reviewed on 10/21/2013 Name Date Influenza Vaccine 10/21/2013 11:54 AM  
 Influenza Vaccine (Quad) 9/8/2015  1:30 PM  
 Influenza Vaccine (Quad) PF 9/18/2017, 10/12/2016 Not reviewed this visit You Were Diagnosed With   
  
 Codes Comments DM type 2, goal HbA1c < 7% (HCC)    -  Primary ICD-10-CM: E11.9 ICD-9-CM: 250.00 Vitamin B 12 deficiency     ICD-10-CM: E53.8 ICD-9-CM: 266.2 Hypothyroidism due to acquired atrophy of thyroid     ICD-10-CM: E03.4 ICD-9-CM: 244.8, 246.8 Hypercholesteremia     ICD-10-CM: E78.00 ICD-9-CM: 272.0 Vitals BP Pulse Temp Resp Height(growth percentile) Weight(growth percentile) Subjective   James Solis is a 69 y.o. male.     History of Present Illness   Patient was seen for hypertension.  Blood pressures been running 130s over 80s.  His former hemoglobin A1c was 7.1%.  Blood sugars at home are running in the 130s to 140s.  His lipids have been controlled with diet exercise and a statin.  Patient did have labs today and will follow-up in 4 days.  He will return to clinic depending on his hemoglobin A1c reading today.    Dictated utilizing Dragon dictation. If there are questions or for further clarification, please contact me.  The following portions of the patient's history were reviewed and updated as appropriate: allergies, current medications, past family history, past medical history, past social history, past surgical history and problem list.    Review of Systems   Constitutional: Negative for fatigue and fever.   HENT: Positive for congestion. Negative for trouble swallowing.    Eyes: Negative for discharge and visual disturbance.   Respiratory: Negative for choking and shortness of breath.    Cardiovascular: Negative for chest pain and palpitations.   Gastrointestinal: Negative for abdominal pain and blood in stool.   Endocrine: Negative.    Genitourinary: Negative for genital sores and hematuria.   Musculoskeletal: Negative for gait problem and joint swelling.   Skin: Negative for color change, pallor, rash and wound.   Allergic/Immunologic: Positive for environmental allergies. Negative for immunocompromised state.   Neurological: Negative for facial asymmetry and speech difficulty.   Psychiatric/Behavioral: Negative for hallucinations and suicidal ideas.       Objective   Physical Exam   Constitutional: He is oriented to person, place, and time. He appears well-developed and well-nourished.   HENT:   Head: Normocephalic.   Eyes: Conjunctivae are normal. Pupils are equal, round, and reactive to light.   Neck: Normal range of motion. Neck supple.   Cardiovascular: Normal rate,  107/70 70 97.6 °F (36.4 °C) (Oral) 15 5' 4\" (1.626 m) 165 lb (74.8 kg) LMP SpO2 BMI OB Status Smoking Status 06/05/2014 97% 28.32 kg/m2 Postmenopausal Former Smoker Vitals History BMI and BSA Data Body Mass Index Body Surface Area  
 28.32 kg/m 2 1.84 m 2 Preferred Pharmacy Pharmacy Name Phone [address] [address] [phone] Your Updated Medication List  
  
   
This list is accurate as of 9/7/18 11:24 AM.  Always use your most recent med list.  
  
  
  
  
 ACCU-CHEK FASTCLIX LANCING DEV Kit Generic drug:  Lancing Device with Lancets  
use as directed to TEST three times a day  
  
 acetaminophen-codeine 300-30 mg per tablet Commonly known as:  TYLENOL-CODEINE #3 Take 1 Tab by mouth every six (6) hours as needed for Pain. Max Daily Amount: 4 Tabs. albuterol 90 mcg/actuation inhaler Commonly known as:  PROVENTIL HFA, VENTOLIN HFA, PROAIR HFA Take 2 Puffs by inhalation every six (6) hours as needed for Wheezing. benztropine 0.5 mg tablet Commonly known as:  COGENTIN Take 0.5 mg by mouth two (2) times a day. cyanocobalamin 1,000 mcg/mL injection Commonly known as:  VITAMIN B-12  
1 mL by IntraMUSCular route once for 1 dose. ferrous sulfate 325 mg (65 mg iron) tablet Take 1 Tab by mouth two (2) times a day. fluticasone 50 mcg/actuation nasal spray Commonly known as:  Sonia Shames 2 Sprays by Both Nostrils route daily. gemfibrozil 600 mg tablet Commonly known as:  LOPID Take 1 Tab by mouth two (2) times a day. glucose blood VI test strips strip Commonly known as:  ACCU-CHEK SMARTVIEW TEST STRIP  
dm  
  
 hydrocortisone 1 % topical cream  
Commonly known as:  CORTAID Apply  to affected area two (2) times a day. use thin layer  
  
 ibuprofen 600 mg tablet Commonly known as:  MOTRIN  
TAKE 1 TABLET BY MOUTH EVERY 8 HOURS AS NEEDED FOR PAIN  
  
 regular rhythm and normal heart sounds.   Pulmonary/Chest: Effort normal and breath sounds normal.   Abdominal: Soft. Bowel sounds are normal.   Musculoskeletal: Normal range of motion.   Neurological: He is alert and oriented to person, place, and time.   Skin: Skin is warm and dry.   Psychiatric: He has a normal mood and affect. His behavior is normal. Judgment and thought content normal.   Nursing note and vitals reviewed.      Assessment/Plan   Problems Addressed this Visit        Cardiovascular and Mediastinum    Hypertension - Primary    Relevant Orders    CBC (No Diff)    Comprehensive Metabolic Panel    Hemoglobin A1c    Lipid Panel    Vitamin D 25 Hydroxy    Hyperlipidemia    Relevant Orders    CBC (No Diff)    Comprehensive Metabolic Panel    Hemoglobin A1c    Lipid Panel    Vitamin D 25 Hydroxy       Endocrine    Type 2 diabetes mellitus with diabetic arthropathy (CMS/HCC)    Relevant Medications    glimepiride (AMARYL) 4 MG tablet    Other Relevant Orders    CBC (No Diff)    Comprehensive Metabolic Panel    Hemoglobin A1c    Lipid Panel    Vitamin D 25 Hydroxy      Other Visit Diagnoses     Vitamin D deficiency         Relevant Orders    Vitamin D 25 Hydroxy                  insulin lispro 100 unit/mL kwikpen Commonly known as:  HumaLOG KwikPen Insulin Administer  6- 8 units three times daily before each meal per sliding scale  
  
 levothyroxine 100 mcg tablet Commonly known as:  SYNTHROID Take 1 Tab by mouth Daily (before breakfast). LORazepam 1 mg tablet Commonly known as:  ATIVAN Take 1 mg by mouth every four (4) hours as needed for Anxiety. mupirocin 2 % ointment Commonly known as:  [employer] Apply  to affected area daily. oxybutynin chloride XL 10 mg CR tablet Commonly known as:  DITROPAN XL Take 1 Tab by mouth daily. rosuvastatin 40 mg tablet Commonly known as:  CRESTOR  
TAKE ONE TABLET BY MOUTH NIGHTLY  
  
 salicylic acid 17 % topical gel Apply  to affected area daily. Apply dime sized application to the callus on the left foot once daily  
  
 sertraline 100 mg tablet Commonly known as:  ZOLOFT Take 150 mg by mouth daily. SITagliptin-metFORMIN 50-1,000 mg per tablet Commonly known as:  Oleg Kehr Take 1 Tab by mouth two (2) times daily (with meals). SURE COMFORT PEN NEEDLE 31 gauge x 3/16\" Ndle Generic drug:  Insulin Needles (Disposable) USE FOR DIABETIC TESTING  
  
 trifluoperazine 5 mg tablet Commonly known as:  STELAZINE Take 5 mg by mouth two (2) times a day. Prescriptions Sent to Pharmacy Refills  
 gemfibrozil (LOPID) 600 mg tablet 1 Sig: Take 1 Tab by mouth two (2) times a day. Class: Normal  
 Pharmacy: [address] [place] [address] [address] #: [phone] Route: Oral  
  
We Performed the Following THER/PROPH/DIAG INJECTION, SUBCUT/IM [id number] CPT(R)] VITAMIN B12 INJECTION [ [facility]] Follow-up Instructions Return in about 7 weeks (around 10/26/2018). To-Do List   
 09/07/2018 Lab:  CBC WITH AUTOMATED DIFF   
  
 09/07/2018 Lab:  LIPID PANEL   
  
 09/07/2018 Lab: METABOLIC PANEL, COMPREHENSIVE   
  
 09/07/2018 Lab:  T3, FREE   
  
 09/07/2018 Lab:  T4, FREE   
  
 09/07/2018 Lab:  TSH 3RD GENERATION Introducing [facility] SERVICES! Tod Tom introduces [employer] patient portal. Now you can access parts of your medical record, email your doctor's office, and request medication refills online. 1. In your internet browser, go to [email or web address] 2. Click on the First Time User? Click Here link in the Sign In box. You will see the New Member Sign Up page. 3. Enter your [employer] Access Code exactly as it appears below. You will not need to use this code after youve completed the sign-up process. If you do not sign up before the expiration date, you must request a new code. · [employer] Access Code: [email or web address] Expires: 11/8/2018 10:22 AM 
 
4. Enter the last four digits of your Social Security Number (xxxx) and Date of Birth (mm/dd/yyyy) as indicated and click Submit. You will be taken to the next sign-up page. 5. Create a [employer] ID. This will be your [employer] login ID and cannot be changed, so think of one that is secure and easy to remember. 6. Create a [employer] password. You can change your password at any time. 7. Enter your Password Reset Question and Answer. This can be used at a later time if you forget your password. 8. Enter your e-mail address. You will receive e-mail notification when new information is available in [address]. 9. Click Sign Up. You can now view and download portions of your medical record. 10. Click the Download Summary menu link to download a portable copy of your medical information. If you have questions, please visit the Frequently Asked Questions section of the [employer] website. Remember, [employer] is NOT to be used for urgent needs. For medical emergencies, dial 911. Now available from your iPhone and Android! Please provide this summary of care documentation to your next provider. Your primary care clinician is listed as Denzel Metz. If you have any questions after today's visit, please call [phone].

## 2019-05-13 LAB
25(OH)D3 SERPL-MCNC: 15.2 NG/ML (ref 30–100)
ALBUMIN SERPL-MCNC: 3.9 G/DL (ref 3.5–5.2)
ALBUMIN/GLOB SERPL: 1.3 G/DL
ALP SERPL-CCNC: 61 U/L (ref 39–117)
ALT SERPL W P-5'-P-CCNC: 12 U/L (ref 1–41)
ANION GAP SERPL CALCULATED.3IONS-SCNC: 12.8 MMOL/L
AST SERPL-CCNC: 16 U/L (ref 1–40)
BILIRUB SERPL-MCNC: 0.2 MG/DL (ref 0.2–1.2)
BUN BLD-MCNC: 25 MG/DL (ref 8–23)
BUN/CREAT SERPL: 13.7 (ref 7–25)
CALCIUM SPEC-SCNC: 9.5 MG/DL (ref 8.6–10.5)
CHLORIDE SERPL-SCNC: 103 MMOL/L (ref 98–107)
CHOLEST SERPL-MCNC: 270 MG/DL (ref 0–200)
CO2 SERPL-SCNC: 20.2 MMOL/L (ref 22–29)
CREAT BLD-MCNC: 1.82 MG/DL (ref 0.76–1.27)
DEPRECATED RDW RBC AUTO: 45.6 FL (ref 37–54)
ERYTHROCYTE [DISTWIDTH] IN BLOOD BY AUTOMATED COUNT: 14.3 % (ref 12.3–15.4)
GFR SERPL CREATININE-BSD FRML MDRD: 37 ML/MIN/1.73
GLOBULIN UR ELPH-MCNC: 2.9 GM/DL
GLUCOSE BLD-MCNC: 138 MG/DL (ref 65–99)
HBA1C MFR BLD: 6.6 % (ref 4.8–5.6)
HCT VFR BLD AUTO: 38.6 % (ref 37.5–51)
HDLC SERPL-MCNC: 38 MG/DL (ref 40–60)
HGB BLD-MCNC: 11.8 G/DL (ref 13–17.7)
LDLC SERPL CALC-MCNC: 184 MG/DL (ref 0–100)
LDLC/HDLC SERPL: 4.83 {RATIO}
MCH RBC QN AUTO: 27.1 PG (ref 26.6–33)
MCHC RBC AUTO-ENTMCNC: 30.6 G/DL (ref 31.5–35.7)
MCV RBC AUTO: 88.7 FL (ref 79–97)
PLATELET # BLD AUTO: 324 10*3/MM3 (ref 140–450)
PMV BLD AUTO: 10.6 FL (ref 6–12)
POTASSIUM BLD-SCNC: 4.4 MMOL/L (ref 3.5–5.2)
PROT SERPL-MCNC: 6.8 G/DL (ref 6–8.5)
RBC # BLD AUTO: 4.35 10*6/MM3 (ref 4.14–5.8)
SODIUM BLD-SCNC: 136 MMOL/L (ref 136–145)
TRIGL SERPL-MCNC: 242 MG/DL (ref 0–150)
VLDLC SERPL-MCNC: 48.4 MG/DL (ref 5–40)
WBC NRBC COR # BLD: 7.31 10*3/MM3 (ref 3.4–10.8)

## 2019-05-13 PROCEDURE — 80053 COMPREHEN METABOLIC PANEL: CPT | Performed by: INTERNAL MEDICINE

## 2019-05-13 PROCEDURE — 85027 COMPLETE CBC AUTOMATED: CPT | Performed by: INTERNAL MEDICINE

## 2019-05-13 PROCEDURE — 83036 HEMOGLOBIN GLYCOSYLATED A1C: CPT | Performed by: INTERNAL MEDICINE

## 2019-05-13 PROCEDURE — 80061 LIPID PANEL: CPT | Performed by: INTERNAL MEDICINE

## 2019-05-13 PROCEDURE — 82306 VITAMIN D 25 HYDROXY: CPT | Performed by: INTERNAL MEDICINE

## 2019-05-13 PROCEDURE — 36415 COLL VENOUS BLD VENIPUNCTURE: CPT | Performed by: INTERNAL MEDICINE

## 2019-05-14 RX ORDER — EZETIMIBE 10 MG/1
10 TABLET ORAL DAILY
Qty: 30 TABLET | Refills: 3 | Status: SHIPPED | OUTPATIENT
Start: 2019-05-14 | End: 2019-10-11 | Stop reason: SDUPTHER

## 2019-05-15 RX ORDER — ERGOCALCIFEROL 1.25 MG/1
CAPSULE ORAL
Qty: 24 CAPSULE | Refills: 3 | Status: SHIPPED | OUTPATIENT
Start: 2019-05-15

## 2019-06-11 ENCOUNTER — TELEPHONE (OUTPATIENT)
Dept: FAMILY MEDICINE CLINIC | Facility: CLINIC | Age: 69
End: 2019-06-11

## 2019-06-11 RX ORDER — LABETALOL 200 MG/1
200 TABLET, FILM COATED ORAL 2 TIMES DAILY
Qty: 180 TABLET | Refills: 1 | Status: SHIPPED | OUTPATIENT
Start: 2019-06-11

## 2019-06-27 ENCOUNTER — TELEPHONE (OUTPATIENT)
Dept: FAMILY MEDICINE CLINIC | Facility: CLINIC | Age: 69
End: 2019-06-27

## 2019-06-28 ENCOUNTER — TELEPHONE (OUTPATIENT)
Dept: FAMILY MEDICINE CLINIC | Facility: CLINIC | Age: 69
End: 2019-06-28

## 2019-06-28 NOTE — TELEPHONE ENCOUNTER
----- Message from Tre De Leon MD sent at 6/28/2019  9:07 AM EDT -----  Patient has been on these for years okay to continue  ----- Message -----  From: Sondra Rehman MA  Sent: 6/28/2019   7:54 AM  To: Tre De Leon MD    Nurse called [phone] when consolidating meds for surgery and said possible class 2 interaction between 1)clonidine and labatolol and 2) clonidine and remeron, 3) lisinopril and xigiduo  WANTS CALL BACK IF MEDICINES OK TO CONTINUE

## 2019-07-08 ENCOUNTER — TELEPHONE (OUTPATIENT)
Dept: FAMILY MEDICINE CLINIC | Facility: CLINIC | Age: 69
End: 2019-07-08

## 2019-07-08 RX ORDER — AMLODIPINE BESYLATE 10 MG/1
TABLET ORAL
Qty: 90 TABLET | Refills: 1 | Status: SHIPPED | OUTPATIENT
Start: 2019-07-08 | End: 2020-02-21

## 2019-07-09 RX ORDER — IPRATROPIUM BROMIDE AND ALBUTEROL SULFATE 2.5; .5 MG/3ML; MG/3ML
3 SOLUTION RESPIRATORY (INHALATION) EVERY 4 HOURS PRN
Qty: 360 ML | Refills: 3 | Status: SHIPPED | OUTPATIENT
Start: 2019-07-09

## 2019-07-16 RX ORDER — LISINOPRIL 40 MG/1
TABLET ORAL
Qty: 90 TABLET | Refills: 1 | Status: SHIPPED | OUTPATIENT
Start: 2019-07-16 | End: 2020-01-27

## 2019-09-27 ENCOUNTER — TELEPHONE (OUTPATIENT)
Dept: FAMILY MEDICINE CLINIC | Facility: CLINIC | Age: 69
End: 2019-09-27

## 2019-09-27 NOTE — TELEPHONE ENCOUNTER
Patient came into office requesting samples of alexsandergdou  mg patient was given samples. Patient originally takes 5-500 but was given  last time because we are out of the 5-500 mg.

## 2019-09-30 RX ORDER — IBUPROFEN 800 MG/1
TABLET ORAL
Qty: 90 TABLET | Refills: 0 | Status: SHIPPED | OUTPATIENT
Start: 2019-09-30 | End: 2019-11-05 | Stop reason: SDUPTHER

## 2019-10-11 RX ORDER — EZETIMIBE 10 MG/1
TABLET ORAL
Qty: 30 TABLET | Refills: 2 | Status: SHIPPED | OUTPATIENT
Start: 2019-10-11 | End: 2020-01-27

## 2019-10-28 ENCOUNTER — OFFICE VISIT (OUTPATIENT)
Dept: FAMILY MEDICINE CLINIC | Facility: CLINIC | Age: 69
End: 2019-10-28

## 2019-10-28 VITALS
OXYGEN SATURATION: 99 % | WEIGHT: 205 LBS | BODY MASS INDEX: 32.18 KG/M2 | HEART RATE: 96 BPM | HEIGHT: 67 IN | TEMPERATURE: 98.6 F | SYSTOLIC BLOOD PRESSURE: 170 MMHG | DIASTOLIC BLOOD PRESSURE: 82 MMHG

## 2019-10-28 DIAGNOSIS — J44.0 CHRONIC OBSTRUCTIVE PULMONARY DISEASE WITH ACUTE LOWER RESPIRATORY INFECTION (HCC): Primary | ICD-10-CM

## 2019-10-28 DIAGNOSIS — R06.2 WHEEZING: ICD-10-CM

## 2019-10-28 PROCEDURE — 99213 OFFICE O/P EST LOW 20 MIN: CPT | Performed by: NURSE PRACTITIONER

## 2019-10-28 RX ORDER — AMOXICILLIN 875 MG/1
875 TABLET, COATED ORAL 2 TIMES DAILY
Qty: 20 TABLET | Refills: 0 | Status: SHIPPED | OUTPATIENT
Start: 2019-10-28 | End: 2019-11-07

## 2019-10-28 RX ORDER — LORATADINE 10 MG/1
10 TABLET ORAL DAILY
Qty: 30 TABLET | Refills: 2 | Status: SHIPPED | OUTPATIENT
Start: 2019-10-28

## 2019-10-28 RX ORDER — BENZONATATE 100 MG/1
100 CAPSULE ORAL 3 TIMES DAILY PRN
Qty: 21 CAPSULE | Refills: 0 | Status: SHIPPED | OUTPATIENT
Start: 2019-10-28 | End: 2020-01-02

## 2019-10-28 NOTE — PATIENT INSTRUCTIONS
Deferred cxr today,   start amoxicillin 875mg bid for 7 days.   claritin 10mg daily as needed for drainage,   Tessalon perles as needed for cough,   Deferred nebulizer today, encouraged to use at home as needed.   If symptoms persist 5-7 days call office   Increase fluid intake, get plenty of rest.   Patient agrees with plan of care and understands instructions. Call if worsening symptoms or any problems or concerns.

## 2019-10-28 NOTE — PROGRESS NOTES
Subjective   James Solis is a 69 y.o. male.     History of Present Illness   C/o nasal drainage. Started about 5 days ago, tried mucinex OTC but did not help, denies fever. Does have sinus pressure, sore throat, he has cough, non productive cough, denies ear pain. With COPD, using albuterol inhaler, nebulizer, nasonex. Did notice wheezing. He did not yet take BP meds.       The following portions of the patient's history were reviewed and updated as appropriate: allergies, current medications, past family history, past medical history, past social history, past surgical history and problem list.    Review of Systems   Constitutional: Negative for chills, diaphoresis and fever.   HENT: Positive for congestion, postnasal drip, rhinorrhea, sinus pressure and sore throat.    Respiratory: Positive for cough and wheezing. Negative for shortness of breath.    Cardiovascular: Negative for chest pain.   Musculoskeletal: Negative for arthralgias and myalgias.   Allergic/Immunologic: Positive for environmental allergies.   Neurological: Negative for dizziness, light-headedness and headache.   All other systems reviewed and are negative.      Objective   Physical Exam   Constitutional: He is oriented to person, place, and time. He appears well-developed and well-nourished.   HENT:   Head: Normocephalic.   Right Ear: Tympanic membrane and ear canal normal.   Left Ear: Tympanic membrane and ear canal normal.   Nose: Mucosal edema present.   Mouth/Throat: Uvula is midline, oropharynx is clear and moist and mucous membranes are normal.   Eyes: Pupils are equal, round, and reactive to light.   Neck: Normal range of motion.   Cardiovascular: Normal rate, regular rhythm, normal heart sounds and intact distal pulses.   Pulmonary/Chest: Effort normal. No respiratory distress. He has no decreased breath sounds. He has wheezes in the right upper field and the left upper field. He has no rhonchi. He has no rales.   Musculoskeletal: Normal  range of motion.   Lymphadenopathy:     He has no cervical adenopathy.   Neurological: He is alert and oriented to person, place, and time.   Skin: Skin is warm and dry.   Psychiatric: He has a normal mood and affect. His behavior is normal.   Nursing note and vitals reviewed.        Assessment/Plan   James was seen today for nasal congestion.    Diagnoses and all orders for this visit:    Chronic obstructive pulmonary disease with acute lower respiratory infection (CMS/HCC)    Wheezing    Other orders  -     amoxicillin (AMOXIL) 875 MG tablet; Take 1 tablet by mouth 2 (Two) Times a Day for 10 days.  -     loratadine (CLARITIN) 10 MG tablet; Take 1 tablet by mouth Daily.  -     benzonatate (TESSALON PERLES) 100 MG capsule; Take 1 capsule by mouth 3 (Three) Times a Day As Needed for Cough.      Deferred cxr today,   start amoxicillin 875mg bid for 7 days.   claritin 10mg daily as needed for drainage,   Tessalon perles as needed for cough,   Deferred nebulizer today, encouraged to use at home as needed.   If symptoms persist 5-7 days call office   Increase fluid intake, get plenty of rest.   Patient agrees with plan of care and understands instructions. Call if worsening symptoms or any problems or concerns.

## 2019-11-05 RX ORDER — IBUPROFEN 800 MG/1
TABLET ORAL
Qty: 90 TABLET | Refills: 1 | Status: SHIPPED | OUTPATIENT
Start: 2019-11-05 | End: 2020-01-31

## 2019-12-30 ENCOUNTER — TELEPHONE (OUTPATIENT)
Dept: FAMILY MEDICINE CLINIC | Facility: CLINIC | Age: 69
End: 2019-12-30

## 2019-12-30 NOTE — TELEPHONE ENCOUNTER
PATIENT HAS FALLEN A COUPLE OF TIMES AND HAS A HAIR LINE FRACTURE IN RIGHT HIP. HE'S HAVING DIZZY SPELLS AND FALLS. WHEN CAN PATIENT BE WORKED IN. HIS DIABETES MEDICATION IS MESSED UP     CALL PATIENTS SISTER JUAN -[phone]

## 2020-01-02 ENCOUNTER — OFFICE VISIT (OUTPATIENT)
Dept: FAMILY MEDICINE CLINIC | Facility: CLINIC | Age: 70
End: 2020-01-02

## 2020-01-02 VITALS
SYSTOLIC BLOOD PRESSURE: 100 MMHG | DIASTOLIC BLOOD PRESSURE: 62 MMHG | OXYGEN SATURATION: 99 % | BODY MASS INDEX: 32.68 KG/M2 | WEIGHT: 208.2 LBS | HEART RATE: 58 BPM | TEMPERATURE: 97.5 F | HEIGHT: 67 IN

## 2020-01-02 DIAGNOSIS — J40 BRONCHITIS: ICD-10-CM

## 2020-01-02 DIAGNOSIS — S32.020A COMPRESSION FRACTURE OF L2 VERTEBRA, INITIAL ENCOUNTER (HCC): Primary | ICD-10-CM

## 2020-01-02 DIAGNOSIS — D64.9 ANEMIA, UNSPECIFIED TYPE: ICD-10-CM

## 2020-01-02 DIAGNOSIS — I10 HYPERTENSION, ESSENTIAL: ICD-10-CM

## 2020-01-02 DIAGNOSIS — E11.9 DIABETES MELLITUS WITHOUT COMPLICATION (HCC): ICD-10-CM

## 2020-01-02 LAB
ERYTHROCYTE [DISTWIDTH] IN BLOOD BY AUTOMATED COUNT: 17.1 % (ref 12.3–15.4)
FERRITIN SERPL-MCNC: 43.8 NG/ML (ref 30–400)
FOLATE SERPL-MCNC: 13.1 NG/ML (ref 4.78–24.2)
HCT VFR BLD AUTO: 37.4 % (ref 37.5–51)
HGB BLD-MCNC: 12 G/DL (ref 13–17.7)
IRON 24H UR-MRATE: 45 MCG/DL (ref 59–158)
IRON SATN MFR SERPL: 12 % (ref 20–50)
LYMPHOCYTES # BLD AUTO: 2.2 10*3/MM3 (ref 0.7–3.1)
LYMPHOCYTES NFR BLD AUTO: 40 % (ref 19.6–45.3)
MCH RBC QN AUTO: 26.6 PG (ref 26.6–33)
MCHC RBC AUTO-ENTMCNC: 32.2 G/DL (ref 31.5–35.7)
MCV RBC AUTO: 82.7 FL (ref 79–97)
MONOCYTES # BLD AUTO: 0.3 10*3/MM3 (ref 0.1–0.9)
MONOCYTES NFR BLD AUTO: 4.6 % (ref 5–12)
NEUTROPHILS # BLD AUTO: 3.1 10*3/MM3 (ref 1.7–7)
NEUTROPHILS NFR BLD AUTO: 55.4 % (ref 42.7–76)
PLATELET # BLD AUTO: 339 10*3/MM3 (ref 140–450)
PMV BLD AUTO: 8.2 FL (ref 6–12)
RBC # BLD AUTO: 4.52 10*6/MM3 (ref 4.14–5.8)
TIBC SERPL-MCNC: 375 MCG/DL (ref 298–536)
TRANSFERRIN SERPL-MCNC: 252 MG/DL (ref 200–360)
VIT B12 BLD-MCNC: 257 PG/ML (ref 211–946)
WBC NRBC COR # BLD: 5.6 10*3/MM3 (ref 3.4–10.8)

## 2020-01-02 PROCEDURE — 36415 COLL VENOUS BLD VENIPUNCTURE: CPT | Performed by: INTERNAL MEDICINE

## 2020-01-02 PROCEDURE — 82607 VITAMIN B-12: CPT | Performed by: INTERNAL MEDICINE

## 2020-01-02 PROCEDURE — 83540 ASSAY OF IRON: CPT | Performed by: INTERNAL MEDICINE

## 2020-01-02 PROCEDURE — 82728 ASSAY OF FERRITIN: CPT | Performed by: INTERNAL MEDICINE

## 2020-01-02 PROCEDURE — 82746 ASSAY OF FOLIC ACID SERUM: CPT | Performed by: INTERNAL MEDICINE

## 2020-01-02 PROCEDURE — 71046 X-RAY EXAM CHEST 2 VIEWS: CPT | Performed by: INTERNAL MEDICINE

## 2020-01-02 PROCEDURE — 84466 ASSAY OF TRANSFERRIN: CPT | Performed by: INTERNAL MEDICINE

## 2020-01-02 PROCEDURE — 85025 COMPLETE CBC W/AUTO DIFF WBC: CPT | Performed by: INTERNAL MEDICINE

## 2020-01-02 PROCEDURE — 99214 OFFICE O/P EST MOD 30 MIN: CPT | Performed by: INTERNAL MEDICINE

## 2020-01-02 RX ORDER — ALBUTEROL SULFATE 90 UG/1
2 AEROSOL, METERED RESPIRATORY (INHALATION) EVERY 4 HOURS PRN
Qty: 1 INHALER | Refills: 0 | Status: SHIPPED | OUTPATIENT
Start: 2020-01-02 | End: 2020-01-03 | Stop reason: SDUPTHER

## 2020-01-02 RX ORDER — OMEPRAZOLE/SODIUM BICARBONATE 40; 1680 MG/1; MG/1
POWDER, FOR SUSPENSION ORAL
COMMUNITY
End: 2020-09-10

## 2020-01-02 RX ORDER — BUDESONIDE AND FORMOTEROL FUMARATE DIHYDRATE 160; 4.5 UG/1; UG/1
AEROSOL RESPIRATORY (INHALATION)
Qty: 1 INHALER | Refills: 0 | Status: SHIPPED | OUTPATIENT
Start: 2020-01-02 | End: 2020-01-03 | Stop reason: SDUPTHER

## 2020-01-02 RX ORDER — ACETAMINOPHEN 500 MG
500 TABLET ORAL EVERY 6 HOURS PRN
COMMUNITY

## 2020-01-02 RX ORDER — AZITHROMYCIN 250 MG/1
TABLET, FILM COATED ORAL
Qty: 6 TABLET | Refills: 0 | Status: SHIPPED | OUTPATIENT
Start: 2020-01-02 | End: 2020-01-03 | Stop reason: SDUPTHER

## 2020-01-02 NOTE — PROGRESS NOTES
Subjective   James Solis is a 69 y.o. male.  Recent fall hitting head hurting lower back as well was seen in ER negative CT does have some back discomfort  Body mass index is 32.61 kg/m².  History of Present Illness   As above recent fall evaluation ER treated and released negative CT of the head patient did have a questionable compression fracture L2 we will get MRI of lumbar spine today to to further define that.  Patient also needs follow-up on diabetes and his anemia blood pressure is controlled possibly overcontrolled we will have him decrease his clonidine down to 0.1 mg tablet half tablet twice daily he will call blood pressure readings on Monday is also got element of bronchitis which we will treat bring up sputum but no increased temperature.  Believes he is out of albuterol so we will get updated refill on that.  Refill his Symbicort as well.  Does include Percocet causing itching Celebrex undefined Crestor undefined Lipitor undefined and Zetia undefined.  Patient is a non-smoker actually former smoker.  Family history is positive for heart disease alcohol abuse asthma and diabetes as well as hypertension  Review of Systems   All other systems reviewed and are negative.      Objective   Vitals:    01/02/20 0934   BP: 100/62   Pulse: 58   Temp: 97.5 °F (36.4 °C)   SpO2: 99%   Weight: 94.4 kg (208 lb 3.2 oz)     Physical Exam   Constitutional: He appears well-developed and well-nourished.   HENT:   Head: Normocephalic and atraumatic.   Right Ear: External ear normal.   Left Ear: External ear normal.   Eyes: Pupils are equal, round, and reactive to light. Conjunctivae are normal.   Cardiovascular: Normal rate, regular rhythm and normal heart sounds.   Pulmonary/Chest: Effort normal and breath sounds normal.   Abdominal: Soft. Bowel sounds are normal.   Musculoskeletal:   Mild soreness lower back no true focal tenderness in the vicinity of L2.  Patient with negative straight raises we will proceed with MRI of  spine to further define questionable L2 fracture as read by radiology at a [facility] facility.   Neurological: He is alert.   Stable gait and station   Skin: Skin is warm and dry.   Nursing note and vitals reviewed.      Lab Results   Component Value Date    INR 1.0 12/26/2019    INR 0.9 06/10/2019    INR 0.9 10/15/2018       Procedures  Chest x-ray PA and lateral obtained.  X-ray comparison from 5/16/2018 without changes noted patient with cardiomegaly.  No active peripheral infiltrate seen.  No acute bony or soft tissue normalities are noted.  Assessment/Plan   1.  Possible compression fracture of L2 status post recent fall proceed with MRI of L-spine    2.  Type 2 diabetes get updated lab values including hemoglobin A1c sugars been fair by patient's value prescription hold glimepiride for now continue other.  Blood sugar readings on Monday as well.    3.  Anemia undefined get better values    4.  Hypertension possibly overcontrolled plan clonidine reduction to 0.1 mg half tablet twice daily call in readings on Monday for Follow-up    5.  Bronchitis plan treat with Z-Tavares does refill inhalers needs albuterol as well as updated Symbicort.  Decrease clonidine half tablet twice daily for the time being  : Blood pressure readings on Monday and you only 1 glucose tablet 1     Much of this encounter note is an electronic transcription/translation of spoken language to printed text.  The electronic translation of spoken language may permit erroneous, or at times, nonsensical words or phrases to be inadvertently transcribed.  Although I have reviewed the note for such errors, some may still exist. If there are questions or for further clarification, please contact me.

## 2020-01-03 RX ORDER — BUDESONIDE AND FORMOTEROL FUMARATE DIHYDRATE 160; 4.5 UG/1; UG/1
AEROSOL RESPIRATORY (INHALATION)
Qty: 1 INHALER | Refills: 0 | Status: SHIPPED | OUTPATIENT
Start: 2020-01-03

## 2020-01-03 RX ORDER — AZITHROMYCIN 250 MG/1
TABLET, FILM COATED ORAL
Qty: 6 TABLET | Refills: 0 | Status: SHIPPED | OUTPATIENT
Start: 2020-01-03 | End: 2020-05-22

## 2020-01-03 RX ORDER — ALBUTEROL SULFATE 90 UG/1
2 AEROSOL, METERED RESPIRATORY (INHALATION) EVERY 4 HOURS PRN
Qty: 1 INHALER | Refills: 3 | Status: SHIPPED | OUTPATIENT
Start: 2020-01-03

## 2020-01-06 DIAGNOSIS — D50.9 IRON DEFICIENCY ANEMIA, UNSPECIFIED IRON DEFICIENCY ANEMIA TYPE: Primary | ICD-10-CM

## 2020-01-27 RX ORDER — EZETIMIBE 10 MG/1
TABLET ORAL
Qty: 30 TABLET | Refills: 3 | Status: SHIPPED | OUTPATIENT
Start: 2020-01-27 | End: 2020-09-10 | Stop reason: SINTOL

## 2020-01-27 RX ORDER — LISINOPRIL 40 MG/1
TABLET ORAL
Qty: 90 TABLET | Refills: 2 | Status: SHIPPED | OUTPATIENT
Start: 2020-01-27

## 2020-01-31 RX ORDER — IBUPROFEN 800 MG/1
TABLET ORAL
Qty: 90 TABLET | Refills: 0 | Status: SHIPPED | OUTPATIENT
Start: 2020-01-31 | End: 2020-02-03

## 2020-02-03 RX ORDER — IBUPROFEN 800 MG/1
TABLET ORAL
Qty: 90 TABLET | Refills: 0 | Status: SHIPPED | OUTPATIENT
Start: 2020-02-03 | End: 2020-05-18 | Stop reason: SDUPTHER

## 2020-02-12 ENCOUNTER — ON CAMPUS - OUTPATIENT (AMBULATORY)
Dept: URBAN - METROPOLITAN AREA HOSPITAL 108 | Facility: HOSPITAL | Age: 70
End: 2020-02-12

## 2020-02-12 DIAGNOSIS — T18.128A FOOD IN ESOPHAGUS CAUSING OTHER INJURY, INITIAL ENCOUNTER: ICD-10-CM

## 2020-02-12 DIAGNOSIS — R13.10 DYSPHAGIA, UNSPECIFIED: ICD-10-CM

## 2020-02-12 PROCEDURE — 43247 EGD REMOVE FOREIGN BODY: CPT | Performed by: INTERNAL MEDICINE

## 2020-02-13 ENCOUNTER — INPATIENT HOSPITAL (AMBULATORY)
Dept: URBAN - METROPOLITAN AREA HOSPITAL 107 | Facility: HOSPITAL | Age: 70
End: 2020-02-13

## 2020-02-13 DIAGNOSIS — T18.128A FOOD IN ESOPHAGUS CAUSING OTHER INJURY, INITIAL ENCOUNTER: ICD-10-CM

## 2020-02-13 DIAGNOSIS — K22.70 BARRETT'S ESOPHAGUS WITHOUT DYSPLASIA: ICD-10-CM

## 2020-02-13 PROCEDURE — 99231 SBSQ HOSP IP/OBS SF/LOW 25: CPT | Performed by: PHYSICIAN ASSISTANT

## 2020-02-19 NOTE — TELEPHONE ENCOUNTER
Patient given 30 day free for his xigduo 5/500 once daily and a sampled 1 pk of 5/1000 per ludmila for him to try so he would not be out of medication. BUT 5/500 DAILY IS WHAT TAKING.

## 2020-02-21 RX ORDER — AMLODIPINE BESYLATE 10 MG/1
TABLET ORAL
Qty: 90 TABLET | Refills: 0 | Status: SHIPPED | OUTPATIENT
Start: 2020-02-21 | End: 2020-05-18 | Stop reason: SDUPTHER

## 2020-03-11 ENCOUNTER — TELEPHONE (OUTPATIENT)
Dept: FAMILY MEDICINE CLINIC | Facility: CLINIC | Age: 70
End: 2020-03-11

## 2020-03-27 ENCOUNTER — TELEPHONE (OUTPATIENT)
Dept: FAMILY MEDICINE CLINIC | Facility: CLINIC | Age: 70
End: 2020-03-27
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
## 2020-04-08 RX ORDER — CLONIDINE HYDROCHLORIDE 0.1 MG/1
TABLET ORAL
Qty: 60 TABLET | Refills: 1 | Status: SHIPPED | OUTPATIENT
Start: 2020-04-08

## 2020-04-20 ENCOUNTER — TELEPHONE (OUTPATIENT)
Dept: FAMILY MEDICINE CLINIC | Facility: CLINIC | Age: 70
End: 2020-04-20

## 2020-05-05 ENCOUNTER — TELEPHONE (OUTPATIENT)
Dept: FAMILY MEDICINE CLINIC | Facility: CLINIC | Age: 70
End: 2020-05-05

## 2020-05-05 NOTE — TELEPHONE ENCOUNTER
PATIENT'S SISTER CALLED TO SPEAK TO [facility] IS REGARDING GETTING SOME SAMPLES LIKE THE ONES THEY GET AT THE OFFICE SOMETIMES. SHE DOES NOT KNOW THE NAME OF THE MEDICATION BUT SAYS [facility] KNOWS.    #: [phone]
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
## 2020-05-18 ENCOUNTER — TELEPHONE (OUTPATIENT)
Dept: FAMILY MEDICINE CLINIC | Facility: CLINIC | Age: 70
End: 2020-05-18

## 2020-05-18 RX ORDER — GLIMEPIRIDE 4 MG/1
4 TABLET ORAL DAILY
Qty: 90 TABLET | Refills: 1 | Status: SHIPPED | OUTPATIENT
Start: 2020-05-18

## 2020-05-18 RX ORDER — AMLODIPINE BESYLATE 10 MG/1
10 TABLET ORAL DAILY
Qty: 90 TABLET | Refills: 1 | Status: SHIPPED | OUTPATIENT
Start: 2020-05-18

## 2020-05-18 RX ORDER — IBUPROFEN 800 MG/1
800 TABLET ORAL 3 TIMES DAILY PRN
Qty: 90 TABLET | Refills: 1 | Status: SHIPPED | OUTPATIENT
Start: 2020-05-18

## 2020-05-18 NOTE — TELEPHONE ENCOUNTER
Rosalina, the patients sister called in stating the pharmacy is still waiting for an approval from the doctor to ok three of his medications.    ibuprofen (ADVIL,MOTRIN) 800 MG tablet   glimepiride (AMARYL) 4 MG tablet   amLODIPine (NORVASC) 10 MG tablet     Rosalina call back [phone]

## 2020-05-22 ENCOUNTER — OFFICE VISIT (OUTPATIENT)
Dept: FAMILY MEDICINE CLINIC | Facility: CLINIC | Age: 70
End: 2020-05-22

## 2020-05-22 VITALS — SYSTOLIC BLOOD PRESSURE: 120 MMHG | DIASTOLIC BLOOD PRESSURE: 80 MMHG

## 2020-05-22 DIAGNOSIS — E78.5 HYPERLIPIDEMIA, UNSPECIFIED HYPERLIPIDEMIA TYPE: ICD-10-CM

## 2020-05-22 DIAGNOSIS — E55.9 VITAMIN D DEFICIENCY, UNSPECIFIED: ICD-10-CM

## 2020-05-22 DIAGNOSIS — I10 ESSENTIAL HYPERTENSION: ICD-10-CM

## 2020-05-22 DIAGNOSIS — Z12.5 PROSTATE CANCER SCREENING: ICD-10-CM

## 2020-05-22 DIAGNOSIS — E11.610 TYPE 2 DIABETES MELLITUS WITH DIABETIC NEUROPATHIC ARTHROPATHY, WITHOUT LONG-TERM CURRENT USE OF INSULIN (HCC): Primary | ICD-10-CM

## 2020-05-22 PROCEDURE — 99443 PR PHYS/QHP TELEPHONE EVALUATION 21-30 MIN: CPT | Performed by: INTERNAL MEDICINE

## 2020-05-22 RX ORDER — DOXYCYCLINE HYCLATE 100 MG
100 TABLET ORAL 2 TIMES DAILY
Qty: 20 TABLET | Refills: 0 | Status: SHIPPED | OUTPATIENT
Start: 2020-05-22 | End: 2020-09-10

## 2020-05-22 NOTE — PROGRESS NOTES
Subjective   James Solis is a 70 y.o. male.     History of Present Illness   You have chosen to receive care through a telephone visit. Do you consent to use a telephone visit for your medical care today? Yes  Patient had a 30-minute phone encounter for diabetes.  Patient's blood sugar has been running in the 130s at home.  Patient does follow a diabetic diet with low impact exercise.  His previous hemoglobin A1c was 7.3% on February 13, 2020.  Patient will have labs drawn when COVID-19 remits.  Patient does monitor blood pressure at home and has been running in the 130s to 120s over 80s.  Patient was advised to continue his present blood pressure treatment.  Patient's lipids also treated with diet exercise and Zetia.  Patient's lipids will be checked with labs as soon as possible.  Patient also has a vitamin D deficiency has been supplemented with over-the-counter.    Dictated utilizing Dragon dictation. If there are questions or for further clarification, please contact me.  The following portions of the patient's history were reviewed and updated as appropriate: allergies, current medications, past family history, past medical history, past social history, past surgical history and problem list.    Review of Systems   Constitutional: Negative for fatigue and fever.   HENT: Positive for congestion. Negative for trouble swallowing.    Eyes: Negative for discharge and visual disturbance.   Respiratory: Negative for choking and shortness of breath.    Cardiovascular: Negative for chest pain and palpitations.   Gastrointestinal: Negative for abdominal pain and blood in stool.   Endocrine: Negative.    Genitourinary: Negative for genital sores and hematuria.   Musculoskeletal: Negative for gait problem and joint swelling.   Skin: Negative for color change, pallor, rash and wound.   Allergic/Immunologic: Positive for environmental allergies. Negative for immunocompromised state.   Neurological: Negative for facial  asymmetry and speech difficulty.   Psychiatric/Behavioral: Negative for hallucinations and suicidal ideas.       Objective   Physical Exam   Constitutional: He is oriented to person, place, and time.   Pulmonary/Chest: Effort normal and breath sounds normal.   Neurological: He is alert and oriented to person, place, and time.   Psychiatric: He has a normal mood and affect. His behavior is normal. Judgment and thought content normal.       Assessment/Plan #1 monitor blood pressure blood sugar at home #2 labs soon as possible #3 continue present diet and activity levels  Diagnoses and all orders for this visit:    Type 2 diabetes mellitus with diabetic neuropathic arthropathy, without long-term current use of insulin (CMS/[facility])  -     CBC (No Diff); Future  -     Comprehensive Metabolic Panel  -     Lipid Panel  -     Vitamin D 25 Hydroxy  -     PSA Screen; Future    Essential hypertension  -     CBC (No Diff); Future  -     Comprehensive Metabolic Panel  -     Lipid Panel  -     Vitamin D 25 Hydroxy  -     PSA Screen; Future    Hyperlipidemia, unspecified hyperlipidemia type  -     CBC (No Diff); Future  -     Comprehensive Metabolic Panel  -     Lipid Panel  -     Vitamin D 25 Hydroxy  -     PSA Screen; Future    Prostate cancer screening  -     CBC (No Diff); Future  -     Comprehensive Metabolic Panel  -     Lipid Panel  -     Vitamin D 25 Hydroxy  -     PSA Screen; Future    Vitamin D deficiency, unspecified   -     Vitamin D 25 Hydroxy    Other orders  -     doxycycline (VIBRAMYICN) 100 MG tablet; Take 1 tablet by mouth 2 (Two) Times a Day.

## 2020-05-29 ENCOUNTER — TELEPHONE (OUTPATIENT)
Dept: FAMILY MEDICINE CLINIC | Facility: CLINIC | Age: 70
End: 2020-05-29

## 2020-05-29 NOTE — TELEPHONE ENCOUNTER
Patients sister called on behalf of patient and requests a return call regarding samples. Patient gets samples for a sugar medication (caller was unaware of name) and requests more samples that can be Picked up.  Please return call and advise.

## 2020-06-29 ENCOUNTER — TELEPHONE (OUTPATIENT)
Dept: FAMILY MEDICINE CLINIC | Facility: CLINIC | Age: 70
End: 2020-06-29

## 2020-06-29 NOTE — TELEPHONE ENCOUNTER
INFORMED JUAN NO SAMPLES AVAILABLE. WILL HAVE DR FENTON SIGN HANDICAP PARKING PERMIT ON HIS RETURN TOMORROW AND PUT UP FRONT FOR

## 2020-06-29 NOTE — TELEPHONE ENCOUNTER
Rosalina, the patients sister called in requesting samples of Janumet for the patient.   Please call to advise @ [phone]    Rosalina also requesting paper work for a handicap sticker he no longer has his license plate which has the sticker and need to get another one.

## 2020-07-07 ENCOUNTER — TELEPHONE (OUTPATIENT)
Dept: FAMILY MEDICINE CLINIC | Facility: CLINIC | Age: 70
End: 2020-07-07

## 2020-07-07 NOTE — TELEPHONE ENCOUNTER
JUAN CALLED IN AND IS REQUESTING SAMPLES OF  PATIENT BLOOD SUGAR MEDICATION.  IF HE COULD PICK THEM UP PLEASE CALL PATIENT AND ADVISE -[phone].

## 2020-08-04 ENCOUNTER — TELEPHONE (OUTPATIENT)
Dept: FAMILY MEDICINE CLINIC | Facility: CLINIC | Age: 70
End: 2020-08-04

## 2020-08-04 NOTE — TELEPHONE ENCOUNTER
PATIENT STATE: that he would like to  his samples for his blood please advise     PATIENT CAN BE REACHED ON:  [phone]

## 2020-09-10 ENCOUNTER — OFFICE VISIT (OUTPATIENT)
Dept: FAMILY MEDICINE CLINIC | Facility: CLINIC | Age: 70
End: 2020-09-10

## 2020-09-10 VITALS
DIASTOLIC BLOOD PRESSURE: 74 MMHG | HEIGHT: 67 IN | HEART RATE: 67 BPM | RESPIRATION RATE: 18 BRPM | WEIGHT: 210 LBS | BODY MASS INDEX: 32.96 KG/M2 | OXYGEN SATURATION: 99 % | SYSTOLIC BLOOD PRESSURE: 146 MMHG | TEMPERATURE: 99.1 F

## 2020-09-10 DIAGNOSIS — J34.89 SINUS PRESSURE: ICD-10-CM

## 2020-09-10 DIAGNOSIS — R59.9 GLANDS SWOLLEN: ICD-10-CM

## 2020-09-10 DIAGNOSIS — B37.0 THRUSH: Primary | ICD-10-CM

## 2020-09-10 LAB — KOH PREP NAIL: NORMAL

## 2020-09-10 PROCEDURE — 99213 OFFICE O/P EST LOW 20 MIN: CPT | Performed by: NURSE PRACTITIONER

## 2020-09-10 PROCEDURE — 87220 TISSUE EXAM FOR FUNGI: CPT | Performed by: NURSE PRACTITIONER

## 2020-09-10 RX ORDER — AMOXICILLIN 875 MG/1
875 TABLET, COATED ORAL 2 TIMES DAILY
Qty: 14 TABLET | Refills: 0 | Status: SHIPPED | OUTPATIENT
Start: 2020-09-10 | End: 2020-09-17

## 2020-09-10 NOTE — PROGRESS NOTES
"Subjective   James Solis is a 70 y.o. male.     History of Present Illness   C/o swollen glands, states comes and goes for about 1 month. States he has aches, pain with ROM, he denies sore throat. Denies mouth sores but states his mouth feels \"raw\" he states he noticed this after eating biscuits and gravy, denies ear pain, but states neck pain near right ear. Denies drainage, does have sinus pressure and watery eyes. He did not try anything OTC, denies fever. Denies burning mouth on hot food or liquids.       The following portions of the patient's history were reviewed and updated as appropriate: allergies, current medications, past family history, past medical history, past social history, past surgical history and problem list.    Review of Systems   Constitutional: Negative for chills, diaphoresis and fever.   HENT: Positive for sinus pressure and swollen glands. Negative for ear pain, postnasal drip, rhinorrhea and sore throat.    Respiratory: Negative for cough and shortness of breath.    Cardiovascular: Negative for chest pain.   Musculoskeletal: Negative for arthralgias and myalgias.   Neurological: Negative for dizziness, light-headedness and headache.   All other systems reviewed and are negative.      Objective   Physical Exam   Constitutional: He is oriented to person, place, and time. He appears well-developed and well-nourished.   HENT:   Head: Normocephalic.   Right Ear: Tympanic membrane and ear canal normal.   Left Ear: Tympanic membrane and ear canal normal.   Nose: Nose normal.   Mouth/Throat: Uvula is midline, oropharynx is clear and moist and mucous membranes are normal. No oral lesions. Normal dentition. No dental abscesses.       Eyes: Pupils are equal, round, and reactive to light.   Neck: Normal range of motion. Neck supple.   Cardiovascular: Normal rate, regular rhythm and normal heart sounds.   Pulmonary/Chest: Effort normal and breath sounds normal.   Musculoskeletal: Normal range of " motion.   Lymphadenopathy:     He has no cervical adenopathy.   Neurological: He is alert and oriented to person, place, and time.   Skin: Skin is warm and dry.   Psychiatric: He has a normal mood and affect. His behavior is normal.   Nursing note and vitals reviewed.        Assessment/Plan   James was seen today for mouth raw.    Diagnoses and all orders for this visit:    Thrush  -     KOH Prep - Swab, Tongue    Glands swollen    Sinus pressure    Other orders  -     amoxicillin (AMOXIL) 875 MG tablet; Take 1 tablet by mouth 2 (Two) Times a Day for 7 days.        ZAIRE today, negative.   Start amoxicillin 875mg bid for 7 days,   If symptoms persist can consider neck US if needed.   Increase fluid intake, get plenty of rest.   Patient agrees with plan of care and understands instructions. Call if worsening symptoms or any problems or concerns.

## 2020-09-28 ENCOUNTER — TELEPHONE (OUTPATIENT)
Dept: FAMILY MEDICINE CLINIC | Facility: CLINIC | Age: 70
End: 2020-09-28

## 2020-09-28 DIAGNOSIS — R59.0 CERVICAL ADENOPATHY: Primary | ICD-10-CM

## 2020-09-28 NOTE — TELEPHONE ENCOUNTER
PATIENT CALLED TO ADVISE NOD ON NECK STILL THERE, SYMPTOMS HAVE NOT IMPROVED, WAS TOLD TO CALL AND ASK EUGENE TO GO AHEAD AND ORDER US OF NECK  HE'D LIKE TO GET IT DONE IN [place]    PLEASE ADVISE    [phone]

## 2020-10-23 ENCOUNTER — TELEPHONE (OUTPATIENT)
Dept: FAMILY MEDICINE CLINIC | Facility: CLINIC | Age: 70
End: 2020-10-23

## 2020-10-23 NOTE — TELEPHONE ENCOUNTER
PT CALLED TO SEE IF WE HAVE ANY SAMPLES OF JANUMET ON HAND THAT HE CAN . PT WILL BE OUT IN TWO OR THREE DAYS.    PLEASE ADVISE.    CALLBACK NUMBER: [phone]

## 2022-05-08 NOTE — PATIENT INSTRUCTIONS
ZAIRE today, negative.   Start amoxicillin 875mg bid for 7 days,   If symptoms persist can consider neck US if needed.   Increase fluid intake, get plenty of rest.   Patient agrees with plan of care and understands instructions. Call if worsening symptoms or any problems or concerns.     
[place]

## 2022-08-01 NOTE — TELEPHONE ENCOUNTER
Patient called back to say that the [facility] pharmacy called him and said the medication would not cost him anything. I told him Dr. Loretta Sanchez had called in the scrip to [facility] like he asked. He said his wife was stuck in traffic and it was going to take her 45 minutes with no traffic. He would like Dr. Loretta Sanchez to call the script in to the [facility] pharmacy on [address] in [place].  He would like someone to call him back to let him know it's at the [facility]. Samples to patient.h